# Patient Record
Sex: FEMALE | Race: WHITE | HISPANIC OR LATINO
[De-identification: names, ages, dates, MRNs, and addresses within clinical notes are randomized per-mention and may not be internally consistent; named-entity substitution may affect disease eponyms.]

---

## 2018-06-07 ENCOUNTER — RESULT REVIEW (OUTPATIENT)
Age: 43
End: 2018-06-07

## 2019-06-14 ENCOUNTER — RECORD ABSTRACTING (OUTPATIENT)
Age: 44
End: 2019-06-14

## 2019-06-14 DIAGNOSIS — Z83.3 FAMILY HISTORY OF DIABETES MELLITUS: ICD-10-CM

## 2019-06-14 DIAGNOSIS — Z87.410 PERSONAL HISTORY OF CERVICAL DYSPLASIA: ICD-10-CM

## 2019-06-14 DIAGNOSIS — F41.9 ANXIETY DISORDER, UNSPECIFIED: ICD-10-CM

## 2019-06-14 DIAGNOSIS — Z80.3 FAMILY HISTORY OF MALIGNANT NEOPLASM OF BREAST: ICD-10-CM

## 2019-06-14 DIAGNOSIS — E03.9 HYPOTHYROIDISM, UNSPECIFIED: ICD-10-CM

## 2019-06-14 LAB — CYTOLOGY CVX/VAG DOC THIN PREP: NORMAL

## 2019-06-14 RX ORDER — LEVOTHYROXINE SODIUM 0.14 MG/1
137 TABLET ORAL
Refills: 0 | Status: ACTIVE | COMMUNITY

## 2019-09-12 ENCOUNTER — APPOINTMENT (OUTPATIENT)
Dept: OBGYN | Facility: CLINIC | Age: 44
End: 2019-09-12
Payer: COMMERCIAL

## 2019-09-12 VITALS
BODY MASS INDEX: 29.19 KG/M2 | WEIGHT: 171 LBS | HEIGHT: 64 IN | SYSTOLIC BLOOD PRESSURE: 112 MMHG | DIASTOLIC BLOOD PRESSURE: 70 MMHG

## 2019-09-12 DIAGNOSIS — Z12.39 ENCOUNTER FOR OTHER SCREENING FOR MALIGNANT NEOPLASM OF BREAST: ICD-10-CM

## 2019-09-12 DIAGNOSIS — Z01.411 ENCOUNTER FOR GYNECOLOGICAL EXAMINATION (GENERAL) (ROUTINE) WITH ABNORMAL FINDINGS: ICD-10-CM

## 2019-09-12 DIAGNOSIS — N93.9 ABNORMAL UTERINE AND VAGINAL BLEEDING, UNSPECIFIED: ICD-10-CM

## 2019-09-12 DIAGNOSIS — Z86.19 PERSONAL HISTORY OF OTHER INFECTIOUS AND PARASITIC DISEASES: ICD-10-CM

## 2019-09-12 DIAGNOSIS — Z92.89 PERSONAL HISTORY OF OTHER MEDICAL TREATMENT: ICD-10-CM

## 2019-09-12 DIAGNOSIS — Z87.42 PERSONAL HISTORY OF OTHER DISEASES OF THE FEMALE GENITAL TRACT: ICD-10-CM

## 2019-09-12 LAB
DATE COLLECTED: NORMAL
HEMOCCULT SP1 STL QL: NEGATIVE
QUALITY CONTROL: YES

## 2019-09-12 PROCEDURE — 82270 OCCULT BLOOD FECES: CPT

## 2019-09-12 PROCEDURE — 99396 PREV VISIT EST AGE 40-64: CPT

## 2019-09-12 PROCEDURE — 99213 OFFICE O/P EST LOW 20 MIN: CPT | Mod: 25

## 2019-09-13 NOTE — HISTORY OF PRESENT ILLNESS
[1 Year Ago] : 1 year ago [Good] : being in good health [Healthy Diet] : a healthy diet [Regular Exercise] : regular exercise [Last Pap ___] : Last cervical pap smear was [unfilled] [Perimenopausal] : is perimenopausal [Menstrual Problems] : reports abnormal menses [Irregular Cycle Intervals] : are  irregular [Irregular Duration] : has been irregular [Pregnancy History] : pregnancy history: [Total Preg ___] : [unfilled] [Full Term ___] : [unfilled] [Living ___] : [unfilled] [Definite:  ___ (Date)] : the last menstrual period was [unfilled] [Menarche Age: ____] : age at menarche was [unfilled] [Weight Concerns] : no concerns with her weight [Contraception] : does not use contraception [de-identified] : p u/s 07/14/2015 [Hot Flashes] : no hot flashes [Night Sweats] : no night sweats [Vaginal Itching] : no vaginal itching [Mood Changes] : no mood changes [Sexually Active] : is not sexually active [Regular Cycle Intervals] : periods have been irregular

## 2019-09-13 NOTE — PHYSICAL EXAM
[Awake] : awake [Alert] : alert [Soft] : soft [Oriented x3] : oriented to person, place, and time [Labia Minora] : labia minora [Normal] : clitoris [Labia Majora] : labia major [No Bleeding] : there was no active vaginal bleeding [Pap Obtained] : a Pap smear was performed [Uterine Adnexae] : were not tender and not enlarged [No Tenderness] : no rectal tenderness [Nl Sphincter Tone] : normal sphincter tone [Acute Distress] : no acute distress [Mass] : no breast mass [Nipple Discharge] : no nipple discharge [Axillary LAD] : no axillary lymphadenopathy [Tender] : non tender [Distended] : not distended [H/Smegaly] : no hepatosplenomegaly [Depressed Mood] : not depressed [Flat Affect] : affect not flat [Occult Blood] : occult blood test from digital rectal exam was negative

## 2019-09-13 NOTE — END OF VISIT
[FreeTextEntry3] : I, Shankar Silva, acted solely as a scribe for Dr. Ashley on this date 09/12/2019.\par All medical record entries made by the Scribe were at my, Dr. Ashley’s direction and personally dictated by me on  09/12/2019. I have reviewed the chart and agree that the record accurately reflects my personal performance of the history, physical exam, assessment and plan. I have also personally directed, reviewed, and agreed with the chart.\par \par

## 2019-09-14 LAB — HPV HIGH+LOW RISK DNA PNL CVX: NOT DETECTED

## 2019-09-17 LAB — CYTOLOGY CVX/VAG DOC THIN PREP: NORMAL

## 2019-11-04 ENCOUNTER — APPOINTMENT (OUTPATIENT)
Dept: OBGYN | Facility: CLINIC | Age: 44
End: 2019-11-04

## 2020-01-11 ENCOUNTER — EMERGENCY (EMERGENCY)
Facility: HOSPITAL | Age: 45
LOS: 1 days | Discharge: HOME | End: 2020-01-11
Attending: EMERGENCY MEDICINE
Payer: COMMERCIAL

## 2020-01-11 VITALS
HEIGHT: 63 IN | TEMPERATURE: 98 F | RESPIRATION RATE: 18 BRPM | SYSTOLIC BLOOD PRESSURE: 133 MMHG | WEIGHT: 167.99 LBS | HEART RATE: 59 BPM | DIASTOLIC BLOOD PRESSURE: 87 MMHG | OXYGEN SATURATION: 100 %

## 2020-01-11 VITALS
OXYGEN SATURATION: 100 % | RESPIRATION RATE: 16 BRPM | DIASTOLIC BLOOD PRESSURE: 74 MMHG | SYSTOLIC BLOOD PRESSURE: 109 MMHG | HEART RATE: 54 BPM

## 2020-01-11 LAB
ALBUMIN SERPL ELPH-MCNC: 4.1 G/DL — SIGNIFICANT CHANGE UP (ref 3.3–5.2)
ALP SERPL-CCNC: 69 U/L — SIGNIFICANT CHANGE UP (ref 40–120)
ALT FLD-CCNC: 8 U/L — SIGNIFICANT CHANGE UP
ANION GAP SERPL CALC-SCNC: 10 MMOL/L — SIGNIFICANT CHANGE UP (ref 5–17)
APPEARANCE UR: CLEAR — SIGNIFICANT CHANGE UP
AST SERPL-CCNC: 16 U/L — SIGNIFICANT CHANGE UP
BACTERIA # UR AUTO: ABNORMAL
BASOPHILS # BLD AUTO: 0.04 K/UL — SIGNIFICANT CHANGE UP (ref 0–0.2)
BASOPHILS NFR BLD AUTO: 0.5 % — SIGNIFICANT CHANGE UP (ref 0–2)
BILIRUB SERPL-MCNC: 0.4 MG/DL — SIGNIFICANT CHANGE UP (ref 0.4–2)
BILIRUB UR-MCNC: NEGATIVE — SIGNIFICANT CHANGE UP
BUN SERPL-MCNC: 18 MG/DL — SIGNIFICANT CHANGE UP (ref 8–20)
CALCIUM SERPL-MCNC: 9.4 MG/DL — SIGNIFICANT CHANGE UP (ref 8.6–10.2)
CHLORIDE SERPL-SCNC: 103 MMOL/L — SIGNIFICANT CHANGE UP (ref 98–107)
CO2 SERPL-SCNC: 25 MMOL/L — SIGNIFICANT CHANGE UP (ref 22–29)
COLOR SPEC: YELLOW — SIGNIFICANT CHANGE UP
CREAT SERPL-MCNC: 1.01 MG/DL — SIGNIFICANT CHANGE UP (ref 0.5–1.3)
DIFF PNL FLD: ABNORMAL
EOSINOPHIL # BLD AUTO: 0.16 K/UL — SIGNIFICANT CHANGE UP (ref 0–0.5)
EOSINOPHIL NFR BLD AUTO: 2.1 % — SIGNIFICANT CHANGE UP (ref 0–6)
EPI CELLS # UR: SIGNIFICANT CHANGE UP
GLUCOSE SERPL-MCNC: 98 MG/DL — SIGNIFICANT CHANGE UP (ref 70–115)
GLUCOSE UR QL: NEGATIVE MG/DL — SIGNIFICANT CHANGE UP
HCG SERPL-ACNC: <4 MIU/ML — SIGNIFICANT CHANGE UP
HCT VFR BLD CALC: 33.3 % — LOW (ref 34.5–45)
HGB BLD-MCNC: 11.4 G/DL — LOW (ref 11.5–15.5)
IMM GRANULOCYTES NFR BLD AUTO: 0.5 % — SIGNIFICANT CHANGE UP (ref 0–1.5)
KETONES UR-MCNC: NEGATIVE — SIGNIFICANT CHANGE UP
LEUKOCYTE ESTERASE UR-ACNC: NEGATIVE — SIGNIFICANT CHANGE UP
LYMPHOCYTES # BLD AUTO: 2.38 K/UL — SIGNIFICANT CHANGE UP (ref 1–3.3)
LYMPHOCYTES # BLD AUTO: 30.9 % — SIGNIFICANT CHANGE UP (ref 13–44)
MCHC RBC-ENTMCNC: 30.2 PG — SIGNIFICANT CHANGE UP (ref 27–34)
MCHC RBC-ENTMCNC: 34.2 GM/DL — SIGNIFICANT CHANGE UP (ref 32–36)
MCV RBC AUTO: 88.3 FL — SIGNIFICANT CHANGE UP (ref 80–100)
MONOCYTES # BLD AUTO: 0.6 K/UL — SIGNIFICANT CHANGE UP (ref 0–0.9)
MONOCYTES NFR BLD AUTO: 7.8 % — SIGNIFICANT CHANGE UP (ref 2–14)
NEUTROPHILS # BLD AUTO: 4.47 K/UL — SIGNIFICANT CHANGE UP (ref 1.8–7.4)
NEUTROPHILS NFR BLD AUTO: 58.2 % — SIGNIFICANT CHANGE UP (ref 43–77)
NITRITE UR-MCNC: NEGATIVE — SIGNIFICANT CHANGE UP
PH UR: 6 — SIGNIFICANT CHANGE UP (ref 5–8)
PLATELET # BLD AUTO: 337 K/UL — SIGNIFICANT CHANGE UP (ref 150–400)
POTASSIUM SERPL-MCNC: 4.1 MMOL/L — SIGNIFICANT CHANGE UP (ref 3.5–5.3)
POTASSIUM SERPL-SCNC: 4.1 MMOL/L — SIGNIFICANT CHANGE UP (ref 3.5–5.3)
PROT SERPL-MCNC: 7.6 G/DL — SIGNIFICANT CHANGE UP (ref 6.6–8.7)
PROT UR-MCNC: NEGATIVE MG/DL — SIGNIFICANT CHANGE UP
RBC # BLD: 3.77 M/UL — LOW (ref 3.8–5.2)
RBC # FLD: 12.4 % — SIGNIFICANT CHANGE UP (ref 10.3–14.5)
RBC CASTS # UR COMP ASSIST: ABNORMAL /HPF (ref 0–4)
SODIUM SERPL-SCNC: 138 MMOL/L — SIGNIFICANT CHANGE UP (ref 135–145)
SP GR SPEC: 1.02 — SIGNIFICANT CHANGE UP (ref 1.01–1.02)
UROBILINOGEN FLD QL: NEGATIVE MG/DL — SIGNIFICANT CHANGE UP
WBC # BLD: 7.69 K/UL — SIGNIFICANT CHANGE UP (ref 3.8–10.5)
WBC # FLD AUTO: 7.69 K/UL — SIGNIFICANT CHANGE UP (ref 3.8–10.5)
WBC UR QL: SIGNIFICANT CHANGE UP

## 2020-01-11 PROCEDURE — 72131 CT LUMBAR SPINE W/O DYE: CPT

## 2020-01-11 PROCEDURE — 81001 URINALYSIS AUTO W/SCOPE: CPT

## 2020-01-11 PROCEDURE — 72125 CT NECK SPINE W/O DYE: CPT

## 2020-01-11 PROCEDURE — 84702 CHORIONIC GONADOTROPIN TEST: CPT

## 2020-01-11 PROCEDURE — 80053 COMPREHEN METABOLIC PANEL: CPT

## 2020-01-11 PROCEDURE — 72128 CT CHEST SPINE W/O DYE: CPT | Mod: 26

## 2020-01-11 PROCEDURE — 71250 CT THORAX DX C-: CPT | Mod: 26

## 2020-01-11 PROCEDURE — 99284 EMERGENCY DEPT VISIT MOD MDM: CPT

## 2020-01-11 PROCEDURE — 71250 CT THORAX DX C-: CPT

## 2020-01-11 PROCEDURE — 72125 CT NECK SPINE W/O DYE: CPT | Mod: 26

## 2020-01-11 PROCEDURE — 36415 COLL VENOUS BLD VENIPUNCTURE: CPT

## 2020-01-11 PROCEDURE — 85027 COMPLETE CBC AUTOMATED: CPT

## 2020-01-11 PROCEDURE — 99053 MED SERV 10PM-8AM 24 HR FAC: CPT

## 2020-01-11 PROCEDURE — 72131 CT LUMBAR SPINE W/O DYE: CPT | Mod: 26

## 2020-01-11 PROCEDURE — 99284 EMERGENCY DEPT VISIT MOD MDM: CPT | Mod: 25

## 2020-01-11 PROCEDURE — 96374 THER/PROPH/DIAG INJ IV PUSH: CPT

## 2020-01-11 RX ORDER — SODIUM CHLORIDE 9 MG/ML
3 INJECTION INTRAMUSCULAR; INTRAVENOUS; SUBCUTANEOUS ONCE
Refills: 0 | Status: COMPLETED | OUTPATIENT
Start: 2020-01-11 | End: 2020-01-11

## 2020-01-11 RX ORDER — METHOCARBAMOL 500 MG/1
1 TABLET, FILM COATED ORAL
Qty: 20 | Refills: 0
Start: 2020-01-11 | End: 2020-01-15

## 2020-01-11 RX ORDER — KETOROLAC TROMETHAMINE 30 MG/ML
30 SYRINGE (ML) INJECTION ONCE
Refills: 0 | Status: DISCONTINUED | OUTPATIENT
Start: 2020-01-11 | End: 2020-01-11

## 2020-01-11 RX ADMIN — Medication 30 MILLIGRAM(S): at 03:11

## 2020-01-11 RX ADMIN — SODIUM CHLORIDE 3 MILLILITER(S): 9 INJECTION INTRAMUSCULAR; INTRAVENOUS; SUBCUTANEOUS at 03:11

## 2020-01-11 NOTE — ED ADULT NURSE NOTE - OBJECTIVE STATEMENT
Pt c/o lower back pain s/p mechanical fall at work yesterday. Pt states she slipped and fell on a box at work and landed on her back. Pt denies LOC, nausea, vomiting, HA. Pt also has pain to L arm. Pt PMHx of hypothyroidism. Pt states she took ASA 1000mg with 600mg caffeine at work.

## 2020-01-11 NOTE — ED PROVIDER NOTE - NSFOLLOWUPINSTRUCTIONS_ED_ALL_ED_FT
Advil 3 tabs every 6 hrs with food as needed for pain  Robaxin 500 mg 4x daily for muscle spasms  Percocet 5/325 mg 1 tab every 6 hrs for moderate to severe pain  Follow up with Dr. John next 2-3 days  Return sooner for any problems      Back Pain    Back pain is very common in adults. The cause of back pain is rarely dangerous and the pain often gets better over time. The cause of your back pain may not be known and may include strain of muscles or ligaments, degeneration of the spinal disks, or arthritis. Occasionally the pain may radiate down your leg(s). Over-the-counter medicines to reduce pain and inflammation are often the most helpful. Stretching and remaining active frequently helps the healing process.     SEEK IMMEDIATE MEDICAL CARE IF YOU HAVE ANY OF THE FOLLOWING SYMPTOMS: bowel or bladder control problems, unusual weakness or numbness in your arms or legs, nausea or vomiting, abdominal pain, fever, dizziness/lightheadedness.

## 2020-01-11 NOTE — ED ADULT NURSE NOTE - CHPI ED NUR SYMPTOMS NEG
no bleeding/no numbness/no weakness/no fever/no deformity/no loss of consciousness/no confusion/no vomiting/no tingling

## 2020-01-11 NOTE — ED PROVIDER NOTE - PROGRESS NOTE DETAILS
CT results and labs as noted.  Pt with moderate improvement with IV Toradol.  will d/c on Advil, Robaxin and Percocet with f/u as outpt

## 2020-01-11 NOTE — ED ADULT NURSE NOTE - NSIMPLEMENTINTERV_GEN_ALL_ED
Implemented All Universal Safety Interventions:  Otho to call system. Call bell, personal items and telephone within reach. Instruct patient to call for assistance. Room bathroom lighting operational. Non-slip footwear when patient is off stretcher. Physically safe environment: no spills, clutter or unnecessary equipment. Stretcher in lowest position, wheels locked, appropriate side rails in place.

## 2020-01-11 NOTE — ED PROVIDER NOTE - SKIN, MLM
Skin normal color for race, warm, dry and intact. No evidence of rash. Contusion to the medial aspect of the left elbow and left flank area.

## 2020-01-11 NOTE — ED PROVIDER NOTE - OBJECTIVE STATEMENT
45 y/o F pt with significant PMHx of hypothyroidism presents to the ED c/o left sided back pain and a contusion to the left forearm s/p a fall that occurred yesterday at work. 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 After the incident she stayed at work, but took a powder medication from her job that was 1000mg of ASA mixed with 600mg of Caffeine. Denies LOC. No further complaints at this time.

## 2020-01-11 NOTE — ED PROVIDER NOTE - PATIENT PORTAL LINK FT
You can access the FollowMyHealth Patient Portal offered by [facility] by registering at the following website: [email or web address]. By joining [facility]’s FollowMyHealth portal, you will also be able to view your health information using other applications (apps) compatible with our system.

## 2021-08-06 ENCOUNTER — LABORATORY RESULT (OUTPATIENT)
Age: 46
End: 2021-08-06

## 2021-08-06 ENCOUNTER — APPOINTMENT (OUTPATIENT)
Dept: RHEUMATOLOGY | Facility: CLINIC | Age: 46
End: 2021-08-06
Payer: COMMERCIAL

## 2021-08-06 VITALS
WEIGHT: 179 LBS | HEART RATE: 66 BPM | OXYGEN SATURATION: 98 % | TEMPERATURE: 98 F | BODY MASS INDEX: 30.56 KG/M2 | HEIGHT: 64 IN | RESPIRATION RATE: 17 BRPM | DIASTOLIC BLOOD PRESSURE: 74 MMHG | SYSTOLIC BLOOD PRESSURE: 102 MMHG

## 2021-08-06 PROCEDURE — 99203 OFFICE O/P NEW LOW 30 MIN: CPT

## 2021-08-09 NOTE — HISTORY OF PRESENT ILLNESS
[FreeTextEntry1] : 46 year old female with PMH as listed below presents today for an initial evaluation\par \par Found to have MICHAEL 1: 640, +SSA, +SSB in the setting of SICCA symptoms. Labs reviewed from 04/2021. \par Has severe dry mouth. ++multiple cavities. Has had multiple extensive dental work.  Unable to swallow chips, cookies, salty food with out water. \par \par Initially attributed symptoms to thyroid medications.\par Reports intermittent polyarthralgias, myalgias, photosensitivity. \par \par Symptoms worse to BL knees. Denies swelling. Denies locking/giving out of the knees. \par \par denies fever, denies parotid gland enlargement, dyspareunia, raynauds, pregnancy complications\par \par FH: father with RA

## 2021-08-09 NOTE — PHYSICAL EXAM
[General Appearance - Alert] : alert [General Appearance - In No Acute Distress] : in no acute distress [General Appearance - Well Nourished] : well nourished [General Appearance - Well Developed] : well developed [Sclera] : the sclera and conjunctiva were normal [Outer Ear] : the ears and nose were normal in appearance [Neck Appearance] : the appearance of the neck was normal [Auscultation Breath Sounds / Voice Sounds] : lungs were clear to auscultation bilaterally [Heart Rate And Rhythm] : heart rate was normal and rhythm regular [Heart Sounds] : normal S1 and S2 [Heart Sounds Gallop] : no gallops [Murmurs] : no murmurs [Heart Sounds Pericardial Friction Rub] : no pericardial rub [Bowel Sounds] : normal bowel sounds [Abdomen Soft] : soft [Abdomen Tenderness] : non-tender [No CVA Tenderness] : no ~M costovertebral angle tenderness [No Spinal Tenderness] : no spinal tenderness [Abnormal Walk] : normal gait [Nail Clubbing] : no clubbing  or cyanosis of the fingernails [Musculoskeletal - Swelling] : no joint swelling seen [Motor Tone] : muscle strength and tone were normal [No Focal Deficits] : no focal deficits [Oriented To Time, Place, And Person] : oriented to person, place, and time [] : no rash

## 2021-08-09 NOTE — ASSESSMENT
[FreeTextEntry1] : Sjogrens syndrome\par \par Prior labs with 1: 640, +SSA, +SSB in the setting of SICCA symptoms, multiple cavities, intermittent polyarthralgias, myalgias, photosensitivity\par \par - labs as below\par - start pilocarpine 5mg BID\par - artificial tears prn, opthalmology f.u\par - biotin mouthwash/spray\par - encouraged hydration, lozenges, omega 3 FA supplementation \par - avoidance of caffeine, sugars, candy, food that can exacerbate dry mouth\par \par Discussed treatment plan with the patient. The patient was given the opportunity to ask questions and all questions were answered to their satisfaction.\par

## 2021-08-16 ENCOUNTER — APPOINTMENT (OUTPATIENT)
Dept: RHEUMATOLOGY | Facility: CLINIC | Age: 46
End: 2021-08-16
Payer: COMMERCIAL

## 2021-08-16 VITALS
HEIGHT: 64 IN | OXYGEN SATURATION: 99 % | SYSTOLIC BLOOD PRESSURE: 110 MMHG | TEMPERATURE: 97.8 F | RESPIRATION RATE: 17 BRPM | DIASTOLIC BLOOD PRESSURE: 70 MMHG | HEART RATE: 60 BPM

## 2021-08-16 LAB
25(OH)D3 SERPL-MCNC: 31.8 NG/ML
ALBUMIN MFR SERPL ELPH: 51.9 %
ALBUMIN SERPL ELPH-MCNC: 4.4 G/DL
ALBUMIN SERPL-MCNC: 4.2 G/DL
ALBUMIN/GLOB SERPL: 1.1 RATIO
ALP BLD-CCNC: 66 U/L
ALPHA1 GLOB MFR SERPL ELPH: 3.5 %
ALPHA1 GLOB SERPL ELPH-MCNC: 0.3 G/DL
ALPHA2 GLOB MFR SERPL ELPH: 8.6 %
ALPHA2 GLOB SERPL ELPH-MCNC: 0.7 G/DL
ALT SERPL-CCNC: 9 U/L
ANA PAT FLD IF-IMP: ABNORMAL
ANA PATTERN: ABNORMAL
ANA SER IF-ACNC: ABNORMAL
ANA TITER: ABNORMAL
ANION GAP SERPL CALC-SCNC: 14 MMOL/L
APPEARANCE: CLEAR
AST SERPL-CCNC: 16 U/L
B-GLOBULIN MFR SERPL ELPH: 10.4 %
B-GLOBULIN SERPL ELPH-MCNC: 0.8 G/DL
BASOPHILS # BLD AUTO: 0.05 K/UL
BASOPHILS NFR BLD AUTO: 0.9 %
BILIRUB SERPL-MCNC: 0.2 MG/DL
BILIRUBIN URINE: NEGATIVE
BLOOD URINE: NORMAL
BUN SERPL-MCNC: 20 MG/DL
C3 SERPL-MCNC: 113 MG/DL
C4 SERPL-MCNC: 14 MG/DL
CALCIUM SERPL-MCNC: 9.8 MG/DL
CCP AB SER IA-ACNC: <8 UNITS
CENTROMERE IGG SER-ACNC: <0.2 CD:130001892
CHLORIDE SERPL-SCNC: 103 MMOL/L
CHROMATIN AB SERPL-ACNC: <0.2 AL
CK SERPL-CCNC: 112 U/L
CO2 SERPL-SCNC: 22 MMOL/L
COLOR: YELLOW
CREAT SERPL-MCNC: 1.3 MG/DL
CREAT SPEC-SCNC: 228 MG/DL
CREAT/PROT UR: 0.1 RATIO
CRP SERPL-MCNC: <3 MG/L
DSDNA AB SER-ACNC: 36 IU/ML
ENA RNP AB SER IA-ACNC: 0.3 AL
ENA RNP AB SER IA-ACNC: 0.3 AL
ENA SM AB SER IA-ACNC: <0.2 AL
ENA SS-A AB SER IA-ACNC: >8 AL
ENA SS-B AB SER IA-ACNC: 7.5 AL
EOSINOPHIL # BLD AUTO: 0.11 K/UL
EOSINOPHIL NFR BLD AUTO: 2 %
ERYTHROCYTE [SEDIMENTATION RATE] IN BLOOD BY WESTERGREN METHOD: 36 MM/HR
GAMMA GLOB FLD ELPH-MCNC: 2 G/DL
GAMMA GLOB MFR SERPL ELPH: 25.6 %
GLUCOSE QUALITATIVE U: NEGATIVE
GLUCOSE SERPL-MCNC: 95 MG/DL
HAV IGM SER QL: NONREACTIVE
HBV CORE IGG+IGM SER QL: NONREACTIVE
HBV CORE IGM SER QL: NONREACTIVE
HBV E AB SER QL: NONREACTIVE
HBV E AG SER QL: NONREACTIVE
HBV SURFACE AB SER QL: ABNORMAL
HBV SURFACE AG SER QL: NONREACTIVE
HCT VFR BLD CALC: 37.8 %
HCV AB SER QL: NONREACTIVE
HCV S/CO RATIO: 0.17 S/CO
HEPATITIS A IGG ANTIBODY: REACTIVE
HEPATITIS A IGG ANTIBODY: REACTIVE
HGB BLD-MCNC: 12.1 G/DL
HISTONE AB SER QL: 0.5 UNITS
IGA 24H UR QL IFE: NORMAL
IMM GRANULOCYTES NFR BLD AUTO: 0.6 %
INTERPRETATION SERPL IEP-IMP: NORMAL
KETONES URINE: NEGATIVE
LEUKOCYTE ESTERASE URINE: ABNORMAL
LYMPHOCYTES # BLD AUTO: 1.45 K/UL
LYMPHOCYTES NFR BLD AUTO: 26.8 %
M PROTEIN SPEC IFE-MCNC: NORMAL
M TB IFN-G BLD-IMP: NEGATIVE
MAN DIFF?: NORMAL
MCHC RBC-ENTMCNC: 30 PG
MCHC RBC-ENTMCNC: 32 GM/DL
MCV RBC AUTO: 93.6 FL
MONOCYTES # BLD AUTO: 0.45 K/UL
MONOCYTES NFR BLD AUTO: 8.3 %
NEUTROPHILS # BLD AUTO: 3.32 K/UL
NEUTROPHILS NFR BLD AUTO: 61.4 %
NITRITE URINE: NEGATIVE
PH URINE: 6
PLATELET # BLD AUTO: 286 K/UL
POTASSIUM SERPL-SCNC: 4.3 MMOL/L
PROT SERPL-MCNC: 8 G/DL
PROT UR-MCNC: 13 MG/DL
PROTEIN URINE: NORMAL
QUANTIFERON TB PLUS MITOGEN MINUS NIL: 5.17 IU/ML
QUANTIFERON TB PLUS NIL: 0.02 IU/ML
QUANTIFERON TB PLUS TB1 MINUS NIL: 0 IU/ML
QUANTIFERON TB PLUS TB2 MINUS NIL: 0 IU/ML
RBC # BLD: 4.04 M/UL
RBC # FLD: 12.3 %
RF+CCP IGG SER-IMP: NEGATIVE
RHEUMATOID FACT SER QL: 72 IU/ML
SODIUM SERPL-SCNC: 139 MMOL/L
SPECIFIC GRAVITY URINE: 1.03
THYROGLOB AB SERPL-ACNC: 263 IU/ML
THYROPEROXIDASE AB SERPL IA-ACNC: 2297 IU/ML
TSH SERPL-ACNC: 12.3 UIU/ML
UROBILINOGEN URINE: NORMAL
WBC # FLD AUTO: 5.41 K/UL

## 2021-08-16 PROCEDURE — 99214 OFFICE O/P EST MOD 30 MIN: CPT

## 2021-08-16 NOTE — ASSESSMENT
[FreeTextEntry1] : Sjogrens/ SLE overlap \par \par + SICCA symptoms, multiple cavities, intermittent polyarthralgias, myalgias, photosensitivity\par \par - start HCQ 200mg BID\par - c/w pilocarpine 5mg BID\par - artificial tears prn, opthalmology f.u\par - biotin mouthwash/spray\par - encouraged hydration, lozenges, omega 3 FA supplementation \par - avoidance of caffeine, sugars, candy, food that can exacerbate dry mouth\par \par Discussed treatment plan with the patient. The patient was given the opportunity to ask questions and all questions were answered to their satisfaction.\par

## 2021-08-16 NOTE — PHYSICAL EXAM
[General Appearance - Alert] : alert [General Appearance - In No Acute Distress] : in no acute distress [General Appearance - Well Nourished] : well nourished [General Appearance - Well Developed] : well developed [Sclera] : the sclera and conjunctiva were normal [Outer Ear] : the ears and nose were normal in appearance [Neck Appearance] : the appearance of the neck was normal [Auscultation Breath Sounds / Voice Sounds] : lungs were clear to auscultation bilaterally [Heart Rate And Rhythm] : heart rate was normal and rhythm regular [Heart Sounds] : normal S1 and S2 [Heart Sounds Gallop] : no gallops [Murmurs] : no murmurs [Heart Sounds Pericardial Friction Rub] : no pericardial rub [Bowel Sounds] : normal bowel sounds [Abdomen Soft] : soft [Abdomen Tenderness] : non-tender [No CVA Tenderness] : no ~M costovertebral angle tenderness [No Spinal Tenderness] : no spinal tenderness [Abnormal Walk] : normal gait [Nail Clubbing] : no clubbing  or cyanosis of the fingernails [Musculoskeletal - Swelling] : no joint swelling seen [] : no rash [Motor Tone] : muscle strength and tone were normal [No Focal Deficits] : no focal deficits [Oriented To Time, Place, And Person] : oriented to person, place, and time

## 2021-08-16 NOTE — HISTORY OF PRESENT ILLNESS
[FreeTextEntry1] : Pt presenting today for a f.u visit. \par Labs from 08/2021 reviewed with pt. \par LV started on pilocarpine 5mg BID. Tolerating medication well. Denies side effects.  Overall reports some improvement in dry mouth.

## 2021-11-22 ENCOUNTER — APPOINTMENT (OUTPATIENT)
Dept: RHEUMATOLOGY | Facility: CLINIC | Age: 46
End: 2021-11-22
Payer: COMMERCIAL

## 2021-11-22 VITALS
HEART RATE: 64 BPM | TEMPERATURE: 97.8 F | DIASTOLIC BLOOD PRESSURE: 70 MMHG | BODY MASS INDEX: 29.71 KG/M2 | SYSTOLIC BLOOD PRESSURE: 112 MMHG | WEIGHT: 174 LBS | HEIGHT: 64 IN | RESPIRATION RATE: 17 BRPM | OXYGEN SATURATION: 98 %

## 2021-11-22 PROCEDURE — 99214 OFFICE O/P EST MOD 30 MIN: CPT

## 2021-11-22 NOTE — ASSESSMENT
[FreeTextEntry1] : Sjogrens/ SLE overlap \par \par + SICCA symptoms, multiple cavities, intermittent polyarthralgias, myalgias, photosensitivity\par \par - repeat labs today\par - c/w HCQ 200mg daily\par - c/w pilocarpine 5mg BID\par - artificial tears prn, opthalmology f.u\par - biotin mouthwash/spray\par - encouraged hydration, lozenges, omega 3 FA supplementation \par - avoidance of caffeine, sugars, candy, food that can exacerbate dry mouth\par \par Discussed treatment plan with the patient. The patient was given the opportunity to ask questions and all questions were answered to their satisfaction.\par

## 2021-11-22 NOTE — PHYSICAL EXAM
[General Appearance - Alert] : alert [General Appearance - In No Acute Distress] : in no acute distress [General Appearance - Well Nourished] : well nourished [General Appearance - Well Developed] : well developed [Sclera] : the sclera and conjunctiva were normal [Outer Ear] : the ears and nose were normal in appearance [Neck Appearance] : the appearance of the neck was normal [Auscultation Breath Sounds / Voice Sounds] : lungs were clear to auscultation bilaterally [Heart Rate And Rhythm] : heart rate was normal and rhythm regular [Heart Sounds] : normal S1 and S2 [Heart Sounds Gallop] : no gallops [Murmurs] : no murmurs [Heart Sounds Pericardial Friction Rub] : no pericardial rub [Bowel Sounds] : normal bowel sounds [Abdomen Soft] : soft [Abdomen Tenderness] : non-tender [No CVA Tenderness] : no ~M costovertebral angle tenderness [No Spinal Tenderness] : no spinal tenderness [Abnormal Walk] : normal gait [Nail Clubbing] : no clubbing  or cyanosis of the fingernails [Musculoskeletal - Swelling] : no joint swelling seen [Motor Tone] : muscle strength and tone were normal [] : no rash [No Focal Deficits] : no focal deficits [Oriented To Time, Place, And Person] : oriented to person, place, and time

## 2021-11-22 NOTE — HISTORY OF PRESENT ILLNESS
[FreeTextEntry1] : Pt presenting today for a f.u visit. \par LV started on HCQ 200mg BID. Also taking pilocarpine 5mg BID. Tolerating medication well. Denies side effects.  \par Overall reports some improvement in dry mouth.\par Continues to have fatigue, ++dry eyes.  Denies joint pain/swelling.

## 2021-12-01 LAB
25(OH)D3 SERPL-MCNC: 26.5 NG/ML
ALBUMIN SERPL ELPH-MCNC: 4.1 G/DL
ALP BLD-CCNC: 49 U/L
ALT SERPL-CCNC: 7 U/L
ANION GAP SERPL CALC-SCNC: 10 MMOL/L
AST SERPL-CCNC: 14 U/L
BASOPHILS # BLD AUTO: 0.05 K/UL
BASOPHILS NFR BLD AUTO: 1 %
BILIRUB SERPL-MCNC: 0.5 MG/DL
BUN SERPL-MCNC: 19 MG/DL
CALCIUM SERPL-MCNC: 9.3 MG/DL
CHLORIDE SERPL-SCNC: 105 MMOL/L
CK SERPL-CCNC: 119 U/L
CO2 SERPL-SCNC: 24 MMOL/L
CREAT SERPL-MCNC: 1.23 MG/DL
CRP SERPL-MCNC: <3 MG/L
ENA SS-A AB SER IA-ACNC: >8 AL
ENA SS-B AB SER IA-ACNC: >8 AL
EOSINOPHIL # BLD AUTO: 0.14 K/UL
EOSINOPHIL NFR BLD AUTO: 2.7 %
ERYTHROCYTE [SEDIMENTATION RATE] IN BLOOD BY WESTERGREN METHOD: 42 MM/HR
GLUCOSE SERPL-MCNC: 96 MG/DL
HCT VFR BLD CALC: 35 %
HGB BLD-MCNC: 11.9 G/DL
IMM GRANULOCYTES NFR BLD AUTO: 0.6 %
LYMPHOCYTES # BLD AUTO: 1.22 K/UL
LYMPHOCYTES NFR BLD AUTO: 23.5 %
MAN DIFF?: NORMAL
MCHC RBC-ENTMCNC: 31.6 PG
MCHC RBC-ENTMCNC: 34 GM/DL
MCV RBC AUTO: 93.1 FL
MONOCYTES # BLD AUTO: 0.48 K/UL
MONOCYTES NFR BLD AUTO: 9.2 %
NEUTROPHILS # BLD AUTO: 3.27 K/UL
NEUTROPHILS NFR BLD AUTO: 63 %
PLATELET # BLD AUTO: 313 K/UL
POTASSIUM SERPL-SCNC: 4.3 MMOL/L
PROT SERPL-MCNC: 7.4 G/DL
RBC # BLD: 3.76 M/UL
RBC # FLD: 12.4 %
SODIUM SERPL-SCNC: 140 MMOL/L
WBC # FLD AUTO: 5.19 K/UL

## 2021-12-27 ENCOUNTER — APPOINTMENT (OUTPATIENT)
Dept: RHEUMATOLOGY | Facility: CLINIC | Age: 46
End: 2021-12-27
Payer: COMMERCIAL

## 2021-12-27 VITALS
WEIGHT: 178 LBS | TEMPERATURE: 98 F | BODY MASS INDEX: 30.39 KG/M2 | HEART RATE: 67 BPM | DIASTOLIC BLOOD PRESSURE: 70 MMHG | OXYGEN SATURATION: 99 % | HEIGHT: 64 IN | SYSTOLIC BLOOD PRESSURE: 102 MMHG | RESPIRATION RATE: 17 BRPM

## 2021-12-27 PROCEDURE — 99214 OFFICE O/P EST MOD 30 MIN: CPT

## 2021-12-27 NOTE — HISTORY OF PRESENT ILLNESS
[FreeTextEntry1] : Pt presenting today for a f.u visit. No current complaints. Reports feeling well overall. \par Labs from 11/2021 reviewed with pt. \par Currently taking HCQ 200mg BID, pilocarpine 5mg BID. Tolerating medication well. Denies side effects.

## 2021-12-27 NOTE — ASSESSMENT
[FreeTextEntry1] : Sjogrens/ SLE overlap \par + SICCA symptoms, multiple cavities, intermittent polyarthralgias, myalgias, photosensitivity\par \par Currently reports feeling well\par \par - c/w HCQ 200mg daily\par - c/w pilocarpine 5mg BID\par - artificial tears prn, opthalmology f.u\par - biotin mouthwash/spray\par - encouraged hydration, lozenges, omega 3 FA supplementation \par - avoidance of caffeine, sugars, candy, food that can exacerbate dry mouth\par \par Discussed treatment plan with the patient. The patient was given the opportunity to ask questions and all questions were answered to their satisfaction.\par

## 2023-05-17 ENCOUNTER — APPOINTMENT (OUTPATIENT)
Dept: RHEUMATOLOGY | Facility: CLINIC | Age: 48
End: 2023-05-17
Payer: COMMERCIAL

## 2023-05-17 VITALS
RESPIRATION RATE: 17 BRPM | HEART RATE: 61 BPM | HEIGHT: 64 IN | OXYGEN SATURATION: 98 % | TEMPERATURE: 98.1 F | WEIGHT: 180 LBS | DIASTOLIC BLOOD PRESSURE: 76 MMHG | SYSTOLIC BLOOD PRESSURE: 120 MMHG | BODY MASS INDEX: 30.73 KG/M2

## 2023-05-17 PROCEDURE — 99215 OFFICE O/P EST HI 40 MIN: CPT

## 2023-05-17 NOTE — ASSESSMENT
[FreeTextEntry1] : Sjogrens/ SLE overlap \par + SICCA symptoms, multiple cavities, intermittent polyarthralgias, myalgias, photosensitivity\par \par Was doing well on HCQ, pilocarpine and then lost to follow up. Last seen 2021\par \par - repeat labs\par - will consider restarting HCQ, pilocarpine after reviewing labs\par - artificial tears prn, opthalmology f.u\par - biotin mouthwash/spray\par - encouraged hydration, lozenges, omega 3 FA supplementation \par - avoidance of caffeine, sugars, candy, food that can exacerbate dry mouth\par - opthalmology and dental f/u\par \par Discussed treatment plan with the patient. The patient was given the opportunity to ask questions and all questions were answered to their satisfaction.\par

## 2023-05-17 NOTE — HISTORY OF PRESENT ILLNESS
[FreeTextEntry1] : Pt presenting today for a f.u visit for Sjogrens/ SLE overlap. Last seen 11/2021 and then lost to follow up. Chart reviewed since LV. \par Was doing well on HCQ, pilocarpine. Off all medications since LV. \par Today presenting with CC of increased fatigue, arthralgias, SICCA symptoms. \par Denies fever, chills, CP, SOB, abd pain, new rashes, falls. Denies swelling to the joints. \par Has not followed up with opthalmology, dentist yet.

## 2023-06-09 ENCOUNTER — APPOINTMENT (OUTPATIENT)
Dept: RHEUMATOLOGY | Facility: CLINIC | Age: 48
End: 2023-06-09

## 2023-06-27 ENCOUNTER — APPOINTMENT (OUTPATIENT)
Dept: RHEUMATOLOGY | Facility: CLINIC | Age: 48
End: 2023-06-27
Payer: COMMERCIAL

## 2023-06-27 VITALS
HEIGHT: 64 IN | BODY MASS INDEX: 30.73 KG/M2 | TEMPERATURE: 97.5 F | HEART RATE: 56 BPM | WEIGHT: 180 LBS | RESPIRATION RATE: 17 BRPM | DIASTOLIC BLOOD PRESSURE: 74 MMHG | OXYGEN SATURATION: 98 % | SYSTOLIC BLOOD PRESSURE: 100 MMHG

## 2023-06-27 LAB
ALBUMIN SERPL ELPH-MCNC: 4.2 G/DL
ALP BLD-CCNC: 67 U/L
ALT SERPL-CCNC: 9 U/L
ANA PAT FLD IF-IMP: ABNORMAL
ANA SER IF-ACNC: ABNORMAL
ANION GAP SERPL CALC-SCNC: 10 MMOL/L
APPEARANCE: CLEAR
AST SERPL-CCNC: 16 U/L
BASOPHILS # BLD AUTO: 0.07 K/UL
BASOPHILS NFR BLD AUTO: 1.3 %
BILIRUB SERPL-MCNC: 0.3 MG/DL
BILIRUBIN URINE: NEGATIVE
BLOOD URINE: ABNORMAL
BUN SERPL-MCNC: 27 MG/DL
C3 SERPL-MCNC: 99 MG/DL
C4 SERPL-MCNC: 12 MG/DL
CALCIUM SERPL-MCNC: 9.2 MG/DL
CCP AB SER IA-ACNC: <8 UNITS
CENTROMERE IGG SER-ACNC: <0.2 AL
CHLORIDE SERPL-SCNC: 105 MMOL/L
CHROMATIN AB SERPL-ACNC: <0.2 AL
CK SERPL-CCNC: 124 U/L
CO2 SERPL-SCNC: 22 MMOL/L
COLOR: YELLOW
CREAT SERPL-MCNC: 1.21 MG/DL
CREAT SPEC-SCNC: 76 MG/DL
CREAT/PROT UR: 0.2 RATIO
CRP SERPL-MCNC: <3 MG/L
DSDNA AB SER-ACNC: 34 IU/ML
EGFR: 56 ML/MIN/1.73M2
ENA RNP AB SER IA-ACNC: 0.3 AL
ENA RNP AB SER IA-ACNC: 0.3 AL
ENA SCL70 IGG SER IA-ACNC: <0.2 AL
ENA SM AB SER IA-ACNC: <0.2 AL
ENA SS-A AB SER IA-ACNC: >8 AL
ENA SS-B AB SER IA-ACNC: >8 AL
EOSINOPHIL # BLD AUTO: 0.19 K/UL
EOSINOPHIL NFR BLD AUTO: 3.4 %
ERYTHROCYTE [SEDIMENTATION RATE] IN BLOOD BY WESTERGREN METHOD: 67 MM/HR
GLUCOSE QUALITATIVE U: NEGATIVE MG/DL
GLUCOSE SERPL-MCNC: 102 MG/DL
HAV IGM SER QL: NONREACTIVE
HBV CORE IGG+IGM SER QL: NONREACTIVE
HBV CORE IGM SER QL: NONREACTIVE
HBV E AB SER QL: NONREACTIVE
HBV E AG SER QL: NONREACTIVE
HBV SURFACE AB SER QL: NONREACTIVE
HBV SURFACE AG SER QL: NONREACTIVE
HCT VFR BLD CALC: 35.4 %
HCV AB SER QL: NONREACTIVE
HCV S/CO RATIO: 0.14 S/CO
HEPATITIS A IGG ANTIBODY: REACTIVE
HEPATITIS A IGG ANTIBODY: REACTIVE
HGB BLD-MCNC: 12.1 G/DL
HISTONE AB SER QL: 0.5 UNITS
IMM GRANULOCYTES NFR BLD AUTO: 0.7 %
KETONES URINE: NEGATIVE MG/DL
LEUKOCYTE ESTERASE URINE: ABNORMAL
LYMPHOCYTES # BLD AUTO: 1.48 K/UL
LYMPHOCYTES NFR BLD AUTO: 26.6 %
M TB IFN-G BLD-IMP: NEGATIVE
MAN DIFF?: NORMAL
MCHC RBC-ENTMCNC: 31.3 PG
MCHC RBC-ENTMCNC: 34.2 GM/DL
MCV RBC AUTO: 91.5 FL
MONOCYTES # BLD AUTO: 0.54 K/UL
MONOCYTES NFR BLD AUTO: 9.7 %
NEUTROPHILS # BLD AUTO: 3.25 K/UL
NEUTROPHILS NFR BLD AUTO: 58.3 %
NITRITE URINE: NEGATIVE
PH URINE: 6
PLATELET # BLD AUTO: 279 K/UL
POTASSIUM SERPL-SCNC: 3.6 MMOL/L
PROT SERPL-MCNC: 7.5 G/DL
PROT UR-MCNC: 14 MG/DL
PROTEIN URINE: NEGATIVE MG/DL
QUANTIFERON TB PLUS MITOGEN MINUS NIL: >10 IU/ML
QUANTIFERON TB PLUS NIL: 0.02 IU/ML
QUANTIFERON TB PLUS TB1 MINUS NIL: 0 IU/ML
QUANTIFERON TB PLUS TB2 MINUS NIL: 0 IU/ML
RBC # BLD: 3.87 M/UL
RBC # FLD: 12.1 %
RF+CCP IGG SER-IMP: NEGATIVE
RHEUMATOID FACT SER QL: 49 IU/ML
RNA POLYMERASE III IGG: 55 UNITS
SODIUM SERPL-SCNC: 137 MMOL/L
SPECIFIC GRAVITY URINE: 1.01
THYROGLOB AB SERPL-ACNC: 225 IU/ML
THYROPEROXIDASE AB SERPL IA-ACNC: 1797 IU/ML
TSH SERPL-ACNC: 5.21 UIU/ML
UROBILINOGEN URINE: 0.2 MG/DL
WBC # FLD AUTO: 5.57 K/UL

## 2023-06-27 PROCEDURE — 99215 OFFICE O/P EST HI 40 MIN: CPT

## 2023-06-29 NOTE — ASSESSMENT
[FreeTextEntry1] : Sjogrens/ SLE overlap \par + SICCA symptoms, multiple cavities, intermittent polyarthralgias, myalgias, photosensitivity\par \par Was doing well on HCQ, pilocarpine and then lost to follow up. Last seen 2021\par \par - restart HCQ 200mg BID, pilocarpine 5mg BID\par - artificial tears prn, opthalmology f.u\par - biotin mouthwash/spray\par - encouraged hydration, lozenges, omega 3 FA supplementation \par - avoidance of caffeine, sugars, candy, food that can exacerbate dry mouth\par - opthalmology and dental f/u\par \par Discussed treatment plan with the patient. The patient was given the opportunity to ask questions and all questions were answered to their satisfaction.\par

## 2023-06-29 NOTE — HISTORY OF PRESENT ILLNESS
[FreeTextEntry1] : Pt presenting today for a f.u visit for Sjogrens/ SLE overlap. Last seen 05/2023. Chart reviewed since . Recent labs from 05/2023 reviewed with pt- Has elevated MICHAEL, SSA/SSB, borderline dsdna, RF, elevated esr. \par Was doing well on HCQ, pilocarpine in the past. Off all medications since 2021\par Today presenting with CC of increased fatigue, arthralgias, SICCA symptoms. \par Denies fever, chills, CP, SOB, abd pain, new rashes, falls. Denies swelling to the joints. \par Has not followed up with opthalmology, dentist yet. \par ROS otherwise unchanged from ,

## 2023-08-01 ENCOUNTER — LABORATORY RESULT (OUTPATIENT)
Age: 48
End: 2023-08-01

## 2023-08-01 ENCOUNTER — APPOINTMENT (OUTPATIENT)
Dept: RHEUMATOLOGY | Facility: CLINIC | Age: 48
End: 2023-08-01
Payer: COMMERCIAL

## 2023-08-01 VITALS
WEIGHT: 180 LBS | DIASTOLIC BLOOD PRESSURE: 64 MMHG | OXYGEN SATURATION: 98 % | BODY MASS INDEX: 30.73 KG/M2 | SYSTOLIC BLOOD PRESSURE: 100 MMHG | RESPIRATION RATE: 17 BRPM | HEIGHT: 64 IN | HEART RATE: 63 BPM

## 2023-08-01 DIAGNOSIS — Z79.899 OTHER LONG TERM (CURRENT) DRUG THERAPY: ICD-10-CM

## 2023-08-01 DIAGNOSIS — R76.0 RAISED ANTIBODY TITER: ICD-10-CM

## 2023-08-01 DIAGNOSIS — M35.00 SICCA SYNDROME, UNSPECIFIED: ICD-10-CM

## 2023-08-01 DIAGNOSIS — M25.50 PAIN IN UNSPECIFIED JOINT: ICD-10-CM

## 2023-08-01 PROCEDURE — 99214 OFFICE O/P EST MOD 30 MIN: CPT

## 2023-08-01 RX ORDER — PILOCARPINE HYDROCHLORIDE 5 MG/1
5 TABLET, FILM COATED ORAL
Qty: 60 | Refills: 5 | Status: ACTIVE | COMMUNITY
Start: 2021-08-06 | End: 1900-01-01

## 2023-08-01 RX ORDER — HYDROXYCHLOROQUINE SULFATE 200 MG/1
200 TABLET, FILM COATED ORAL TWICE DAILY
Qty: 60 | Refills: 5 | Status: ACTIVE | COMMUNITY
Start: 2021-08-16 | End: 1900-01-01

## 2023-08-01 NOTE — HISTORY OF PRESENT ILLNESS
[FreeTextEntry1] : Pt presenting today for a f.u visit for Sjogrens/ SLE overlap. Last seen 06/2023. Chart reviewed since .  Restarted HCQ and pilocarpine LV. Tolerating medication well. Denies side effects.  Overall with significant improvement in symptoms with HCQ.  Denies fever, chills, CP, SOB, abd pain, new rashes, falls. Denies swelling to the joints.  Has not followed up with ophthalmology, dentist yet.  ROS otherwise unchanged from ,

## 2023-08-01 NOTE — ASSESSMENT
[facility] - Intensive Care ([address] Medicine  Progress Note    Patient Name: Andrzej Sinclair  MRN: [id number]  Patient Class: IP- Inpatient   Admission Date: 3/8/2023  Length of Stay: 1 days  Attending Physician: Kay Irwin MD  Primary Care Provider: Viktoriya Jaeger NP        Subjective:     Principal Problem:Intractable vomiting with nausea        HPI:  39 y/o M with medical hx of poorly controlled DM1 and hyperemesis cannabinoid syndrome who re-presents to [facility] for intractable nausea and vomiting. Patient w/ previous admission, discharged 1 day ago for intractable n/v for the past 2 weeks. Pt stated he had vomited 10x the past 24 hours, cannot keep anything down, w/ abdominal pain usually prior to vomiting episodes. He also reports headaches, chest pain, dizziness, and SOB. He denies hemoptysis, fevers, chills, and diarrhea. Pt has been to the hospital 4 times over the past 2 weeks for similar issues. He is a poorly controlled diabetic, stating his sugars usually run around 300 when he checks them. Last A1c was 10.2 on 3/1/23. Pt has tried using PO phenergan and dicyclomine with minimal relief of symptoms. Pt was recently been on insulin pump, but claims it has not been working well for him. He is currently not wearing pump in ED. He has not been able to see GI outpatient for his persistent issues. Pt denies recent use of marijuana and states he has not ingested/smoked marijuana for at least 3-4 weeks.     On both admissions, patient AF HDS, mildly tachycardic particularly following vomiting episodes. Satting well on RA. Labs without significant electrolyte derangement. Glucose 215. BHB 1.5. Labs and presentation unconcerning for DKA. Patient to be re-admitted for intractable n/v w/ intolerance of PO intake.         Overview/Hospital Course:  Admitted for intractable nausea and vomiting in setting of slightly high BHB. Will advance diet as clinically appropriate. Currently on full liquid diet.  [FreeTextEntry1] : Sjogrens/ SLE overlap  + SICCA symptoms, multiple cavities, intermittent polyarthralgias, myalgias, photosensitivity  Was doing well on HCQ, pilocarpine and then lost to follow up. Last seen 2021 Restarted medications 06/2023 and reports feeling much better  - repeat labs prior to follow up - c/w HCQ 200mg BID, pilocarpine 5mg BID - artificial tears prn, ophthalmology f.u - biotin mouthwash/spray - encouraged hydration, lozenges, omega 3 FA supplementation  - avoidance of caffeine, sugars, candy, food that can exacerbate dry mouth - ophthalmology and dental f/u  Discussed treatment plan with the patient. The patient was given the opportunity to ask questions and all questions were answered to their satisfaction.  Course c/b hyperglycemia and worsening abdominal pain. Continues on NS infusion. Adjusted insulin as needed. KUB w non-obstructive bowel gas pattern.       Interval History:     NAEON. On CLD and nausea controlled this AM and patient agreeable to discharge however, early afternoon started having significant nausea post eating pudding.     Review of Systems   Constitutional:  Negative for chills and fever.   HENT:  Negative for congestion.    Eyes:  Negative for visual disturbance.   Respiratory:  Negative for cough, shortness of breath and wheezing.    Cardiovascular:  Negative for chest pain.   Gastrointestinal:  Positive for nausea and vomiting. Negative for abdominal pain, constipation and diarrhea.   Genitourinary:  Negative for difficulty urinating.   Musculoskeletal:  Negative for arthralgias and back pain.   Skin:  Negative for color change and pallor.   Neurological:  Negative for dizziness, tremors, syncope, weakness, light-headedness and headaches.   Psychiatric/Behavioral:  Negative for agitation and behavioral problems.    Objective:     Vital Signs (Most Recent):  Temp: 98 °F (36.7 °C) (03/11/23 1121)  Pulse: (!) 59 (03/11/23 1121)  Resp: 18 (03/11/23 1121)  BP: (!) 195/96 (03/11/23 1121)  SpO2: 100 % (03/11/23 1121)   Vital Signs (24h Range):  Temp:  [98 °F (36.7 °C)-99.4 °F (37.4 °C)] 98 °F (36.7 °C)  Pulse:  [59-83] 59  Resp:  [18] 18  SpO2:  [96 %-100 %] 100 %  BP: (135-195)/(67-96) 195/96     Weight: 90.7 kg (200 lb)  Body mass index is 25.68 kg/m².    Intake/Output Summary (Last 24 hours) at 3/11/2023 1328  Last data filed at 3/11/2023 0824  Gross per 24 hour   Intake 3482.17 ml   Output 200 ml   Net 3282.17 ml        Physical Exam  Constitutional:       Appearance: Normal appearance. He is normal weight.      Comments: Uncomfortable; ill-appearing   HENT:      Head: Normocephalic and atraumatic.      Right Ear: External ear normal.      Left Ear: External ear normal.      Nose: Nose normal.       Mouth/Throat:      Mouth: Mucous membranes are moist.      Pharynx: Oropharynx is clear.   Eyes:      Conjunctiva/sclera: Conjunctivae normal.   Cardiovascular:      Rate and Rhythm: Normal rate and regular rhythm.      Pulses: Normal pulses.      Heart sounds: Normal heart sounds.   Pulmonary:      Effort: Pulmonary effort is normal.      Breath sounds: Normal breath sounds.   Abdominal:      General: Abdomen is flat. Bowel sounds are normal.      Palpations: Abdomen is soft.      Tenderness: There is no abdominal tenderness (diffuse w/ palpation).   Musculoskeletal:      Cervical back: Neck supple.      Right lower leg: No edema.      Left lower leg: No edema.   Skin:     General: Skin is warm and dry.      Capillary Refill: Capillary refill takes less than 2 seconds.   Neurological:      General: No focal deficit present.      Mental Status: He is alert and oriented to person, place, and time. Mental status is at baseline.   Psychiatric:         Mood and Affect: Mood normal.         Behavior: Behavior normal.       Significant Labs: All pertinent labs within the past 24 hours have been reviewed.    Significant Imaging: I have reviewed all pertinent imaging results/findings within the past 24 hours.      Assessment/Plan:      * Intractable vomiting with nausea  Patient re-admitted d/t intolerance of PO intake at home; patient reports attempting to increase liquid intake and increase activity which triggered further nausea/vomiting. Mucous membranes appear moist.     -s/p Droperidol and compazine IV   -scheduled PO phenegran Q6H, PRN zofran IV 8mg and compazine  -advance diet as tolerated   - gentle IVF in setting of continued n/v   -POCT glucose checks; PRNs in case of hypoglycemia  - has PCP apt 3/13 and GI apt in 2 weeks    GERD (gastroesophageal reflux disease)  Complaining of worsening abdominal pain, refractory to GI cocktail and sucrasulfate.     - Home protonix QD  - if patient unable to tolerate full  liquid diet, will consult GI.   - prn GI cocktail and sucralfate     Cannabinoid hyperemesis syndrome  Patient likely experiencing intractable n/v 2/2 cannabis use; UDS on last admission positive for marijuana. Patient reports last use 3-4 weeks ago due to onset of these symptoms. He reports he had not used on discharge 1 day ago as well.    -see intractable n/v    Type 1 diabetes mellitus with complications  Patient's FSGs are uncontrolled due to hyperglycemia on current medication regimen.  Last A1c reviewed-   Lab Results   Component Value Date    HGBA1C 10.2 (H) 03/01/2023     Most recent fingerstick glucose reviewed-   Recent Labs   Lab 03/10/23  1653 03/10/23  2029 03/11/23  0717 03/11/23  1120   POCTGLUCOSE 221* 204* 134* 182*     Current correctional scale  Medium  Maintain anti-hyperglycemic dose as follows-   Antihyperglycemics (From admission, onward)    Start     Stop Route Frequency Ordered    03/10/23 1534  insulin aspart U-100 pen 1-10 Units         -- SubQ Before meals & nightly PRN 03/10/23 1435    03/10/23 1130  insulin aspart U-100 pen 2 Units         -- SubQ 3 times daily with meals 03/10/23 1013    03/10/23 0900  insulin detemir U-100 pen 10 Units         -- SubQ 2 times daily 03/10/23 0638        Hold Oral hypoglycemics while patient is in the hospital.    - Prandial insulin 2U TID WM  - Basal insulin 10U BID   - bg inpatient goal 140-180    Primary hypertension  Home losartan 10mg QD    Ketosis  Patient w/ small elevation in ketones on admission; much lower than previous admission w/ low concern for DKA.     -CTM clinically  -daily CMPs    CKD (chronic kidney disease) stage 3, GFR 30-59 ml/min  Cr 1.2 on admission (at baseline)     -CTM w/ daily RFP      VTE Risk Mitigation (From admission, onward)         Ordered     Place sequential compression device  Until discontinued         03/08/23 2005     Place sequential compression device  Until discontinued         03/08/23 2005                 Discharge Planning   PRECIOUS: 3/11/2023     Code Status: Full Code   Is the patient medically ready for discharge?: No    Reason for patient still in hospital (select all that apply): Treatment  Discharge Plan A: Home with family          Supriya Peck MD  Department of Hospital Medicine   [facility] - Intensive Care ([place]-[address])

## 2023-09-27 ENCOUNTER — EMERGENCY (EMERGENCY)
Facility: HOSPITAL | Age: 48
LOS: 1 days | Discharge: HOME | End: 2023-09-27
Attending: EMERGENCY MEDICINE
Payer: SELF-PAY

## 2023-09-27 VITALS
TEMPERATURE: 98 F | SYSTOLIC BLOOD PRESSURE: 110 MMHG | OXYGEN SATURATION: 98 % | WEIGHT: 183.42 LBS | HEIGHT: 64 IN | DIASTOLIC BLOOD PRESSURE: 76 MMHG | HEART RATE: 69 BPM | RESPIRATION RATE: 20 BRPM

## 2023-09-27 PROCEDURE — 73080 X-RAY EXAM OF ELBOW: CPT

## 2023-09-27 PROCEDURE — 99284 EMERGENCY DEPT VISIT MOD MDM: CPT

## 2023-09-27 PROCEDURE — 73080 X-RAY EXAM OF ELBOW: CPT | Mod: 26,RT

## 2023-09-27 PROCEDURE — 99284 EMERGENCY DEPT VISIT MOD MDM: CPT | Mod: 25

## 2023-09-27 PROCEDURE — 71046 X-RAY EXAM CHEST 2 VIEWS: CPT | Mod: 26

## 2023-09-27 PROCEDURE — 71046 X-RAY EXAM CHEST 2 VIEWS: CPT

## 2023-09-27 RX ORDER — IBUPROFEN 200 MG
1 TABLET ORAL
Qty: 15 | Refills: 0
Start: 2023-09-27 | End: 2023-10-01

## 2023-09-27 RX ORDER — IBUPROFEN 200 MG
600 TABLET ORAL ONCE
Refills: 0 | Status: COMPLETED | OUTPATIENT
Start: 2023-09-27 | End: 2023-09-27

## 2023-09-27 RX ORDER — HYDROCORTISONE 0.5 %
1 CREAM (GRAM) TOPICAL
Qty: 10 | Refills: 0
Start: 2023-09-27 | End: 2023-10-06

## 2023-09-27 RX ORDER — ACETAMINOPHEN 500 MG
650 TABLET ORAL ONCE
Refills: 0 | Status: COMPLETED | OUTPATIENT
Start: 2023-09-27 | End: 2023-09-27

## 2023-09-27 RX ORDER — LIDOCAINE 4 G/100G
1 CREAM TOPICAL ONCE
Refills: 0 | Status: COMPLETED | OUTPATIENT
Start: 2023-09-27 | End: 2023-09-27

## 2023-09-27 RX ORDER — ACETAMINOPHEN 500 MG
2 TABLET ORAL
Qty: 30 | Refills: 0
Start: 2023-09-27 | End: 2023-10-01

## 2023-09-27 RX ORDER — METHOCARBAMOL 500 MG/1
2 TABLET, FILM COATED ORAL
Qty: 18 | Refills: 0
Start: 2023-09-27 | End: 2023-09-29

## 2023-09-27 RX ADMIN — Medication 600 MILLIGRAM(S): at 09:12

## 2023-09-27 RX ADMIN — Medication 650 MILLIGRAM(S): at 09:12

## 2023-09-27 RX ADMIN — LIDOCAINE 1 PATCH: 4 CREAM TOPICAL at 09:12
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
## 2023-09-27 NOTE — ED PROVIDER NOTE - PATIENT PORTAL LINK FT
You can access the FollowMyHealth Patient Portal offered by [facility] by registering at the following website: [email or web address]. By joining [facility]’s FollowMyHealth portal, you will also be able to view your health information using other applications (apps) compatible with our system.
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
## 2023-09-27 NOTE — ED ADULT NURSE NOTE - NS ED NURSE LEVEL OF CONSCIOUSNESS MENTAL STATUS
Subjective   Patient ID: Jocelyne Barroso is a 67 y.o. female who presents for c/o (C/o right rib area pain x  months).    HPI     Review of Systems   Gastrointestinal:  Positive for abdominal pain and nausea. Negative for blood in stool, constipation and diarrhea.       Objective   /82 (BP Location: Right arm)   Pulse 76   Temp 36.7 °C (98 °F) (Temporal)   Ht 1.524 m (5')   Wt 65.3 kg (144 lb)   SpO2 98%   BMI 28.12 kg/m²     Physical Exam  Cardiovascular:      Rate and Rhythm: Regular rhythm.      Heart sounds: Normal heart sounds.   Pulmonary:      Breath sounds: Normal breath sounds.   Abdominal:      Palpations: Abdomen is soft.      Comments: Right upper quadrant tenderness no rebound or guarding.  No palpable masses   Neurological:      Mental Status: She is alert.         Assessment/Plan   Problem List Items Addressed This Visit       Benign hypertension - Primary    Abdominal pain, RUQ (right upper quadrant)    Relevant Orders    MR abdomen w IV contrast MRCP    Creatinine, Serum    Diverticulosis of colon without diverticulitis (Chronic)    Gallbladder sludge    Relevant Orders    MR abdomen w IV contrast MRCP    Creatinine, Serum           Awake/Alert/Cooperative
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
## 2023-10-23 ENCOUNTER — EMERGENCY (EMERGENCY)
Facility: HOSPITAL | Age: 48
LOS: 1 days | Discharge: HOME | End: 2023-10-23
Attending: EMERGENCY MEDICINE
Payer: COMMERCIAL

## 2023-10-23 VITALS
SYSTOLIC BLOOD PRESSURE: 106 MMHG | OXYGEN SATURATION: 98 % | DIASTOLIC BLOOD PRESSURE: 76 MMHG | HEART RATE: 74 BPM | RESPIRATION RATE: 18 BRPM | TEMPERATURE: 98 F

## 2023-10-23 VITALS
TEMPERATURE: 98 F | RESPIRATION RATE: 16 BRPM | HEIGHT: 64 IN | OXYGEN SATURATION: 100 % | HEART RATE: 66 BPM | DIASTOLIC BLOOD PRESSURE: 76 MMHG | WEIGHT: 186.51 LBS | SYSTOLIC BLOOD PRESSURE: 122 MMHG

## 2023-10-23 LAB
APPEARANCE UR: CLEAR — SIGNIFICANT CHANGE UP
APPEARANCE UR: CLEAR — SIGNIFICANT CHANGE UP
BILIRUB UR-MCNC: NEGATIVE — SIGNIFICANT CHANGE UP
BILIRUB UR-MCNC: NEGATIVE — SIGNIFICANT CHANGE UP
COLOR SPEC: YELLOW — SIGNIFICANT CHANGE UP
COLOR SPEC: YELLOW — SIGNIFICANT CHANGE UP
DIFF PNL FLD: NEGATIVE — SIGNIFICANT CHANGE UP
DIFF PNL FLD: NEGATIVE — SIGNIFICANT CHANGE UP
GLUCOSE UR QL: NEGATIVE MG/DL — SIGNIFICANT CHANGE UP
GLUCOSE UR QL: NEGATIVE MG/DL — SIGNIFICANT CHANGE UP
HCG UR QL: NEGATIVE — SIGNIFICANT CHANGE UP
HCG UR QL: NEGATIVE — SIGNIFICANT CHANGE UP
KETONES UR-MCNC: NEGATIVE — SIGNIFICANT CHANGE UP
KETONES UR-MCNC: NEGATIVE — SIGNIFICANT CHANGE UP
LEUKOCYTE ESTERASE UR-ACNC: NEGATIVE — SIGNIFICANT CHANGE UP
LEUKOCYTE ESTERASE UR-ACNC: NEGATIVE — SIGNIFICANT CHANGE UP
NITRITE UR-MCNC: NEGATIVE — SIGNIFICANT CHANGE UP
NITRITE UR-MCNC: NEGATIVE — SIGNIFICANT CHANGE UP
PH UR: 6.5 — SIGNIFICANT CHANGE UP (ref 5–8)
PH UR: 6.5 — SIGNIFICANT CHANGE UP (ref 5–8)
PROT UR-MCNC: NEGATIVE — SIGNIFICANT CHANGE UP
PROT UR-MCNC: NEGATIVE — SIGNIFICANT CHANGE UP
SP GR SPEC: 1.01 — SIGNIFICANT CHANGE UP (ref 1.01–1.02)
SP GR SPEC: 1.01 — SIGNIFICANT CHANGE UP (ref 1.01–1.02)
UROBILINOGEN FLD QL: NEGATIVE MG/DL — SIGNIFICANT CHANGE UP
UROBILINOGEN FLD QL: NEGATIVE MG/DL — SIGNIFICANT CHANGE UP

## 2023-10-23 PROCEDURE — 99284 EMERGENCY DEPT VISIT MOD MDM: CPT

## 2023-10-23 PROCEDURE — 81025 URINE PREGNANCY TEST: CPT

## 2023-10-23 PROCEDURE — 81003 URINALYSIS AUTO W/O SCOPE: CPT

## 2023-10-23 PROCEDURE — 99283 EMERGENCY DEPT VISIT LOW MDM: CPT

## 2023-10-23 RX ORDER — METHOCARBAMOL 500 MG/1
1 TABLET, FILM COATED ORAL
Qty: 20 | Refills: 0
Start: 2023-10-23

## 2023-10-23 RX ORDER — METHOCARBAMOL 500 MG/1
1000 TABLET, FILM COATED ORAL ONCE
Refills: 0 | Status: COMPLETED | OUTPATIENT
Start: 2023-10-23 | End: 2023-10-23

## 2023-10-23 RX ORDER — IBUPROFEN 200 MG
600 TABLET ORAL ONCE
Refills: 0 | Status: COMPLETED | OUTPATIENT
Start: 2023-10-23 | End: 2023-10-23

## 2023-10-23 RX ADMIN — METHOCARBAMOL 1000 MILLIGRAM(S): 500 TABLET, FILM COATED ORAL at 09:38

## 2023-10-23 RX ADMIN — Medication 600 MILLIGRAM(S): at 09:38

## 2023-10-23 NOTE — ED PROVIDER NOTE - PATIENT PORTAL LINK FT
You can access the FollowMyHealth Patient Portal offered by [facility] by registering at the following website: [email or web address]. By joining [facility]’s FollowMyHealth portal, you will also be able to view your health information using other applications (apps) compatible with our system.

## 2023-10-23 NOTE — ED ADULT NURSE NOTE - NSFALLUNIVINTERV_ED_ALL_ED
Bed/Stretcher in lowest position, wheels locked, appropriate side rails in place/Call bell, personal items and telephone in reach/Instruct patient to call for assistance before getting out of bed/chair/stretcher/Non-slip footwear applied when patient is off stretcher/[place] to call system/Physically safe environment - no spills, clutter or unnecessary equipment/Purposeful proactive rounding/Room/bathroom lighting operational, light cord in reach

## 2023-10-23 NOTE — ED ADULT NURSE NOTE - SUICIDE SCREENING DEPRESSION
pt comes to ED for abdominal pain pt went to [facility] on Saturday had lab work done and was d/madisyn told she had gastritis pt has appt with gi tomorrow but the pain got worse today. pt VSS pt appears comfortable NAD Negative

## 2023-10-23 NOTE — ED PROVIDER NOTE - NSFOLLOWUPINSTRUCTIONS_ED_ALL_ED_FT
Anaprox DS 2 times daily with food as needed for pain  Robaxin 500 mg 4 times daily as needed for muscle spasms  Apply moist heat to area 20 minutes on every 3-4 hours while awake  Follow-up with Dr. John next 2 to 3 days  Return sooner for any problems

## 2023-10-23 NOTE — ED PROVIDER NOTE - NS ED ROS FT
Const: Denies fever, chills  HEENT: (+) dry mouth, (+) dry eyes, Denies blurry vision, sore throat  Neck: Denies neck pain/stiffness  Resp: Denies coughing, SOB  Cardiovascular: Denies CP, palpitations, LE edema  GI: Denies nausea, vomiting, abdominal pain, diarrhea, constipation, blood in stool  : Denies urinary frequency/urgency/dysuria, hematuria  MSK: (+) right sided back pain  Neuro: Denies HA, dizziness, numbness, weakness  Skin: Denies rashes.

## 2023-10-23 NOTE — ED PROVIDER NOTE - CLINICAL SUMMARY MEDICAL DECISION MAKING FREE TEXT BOX
Pt with atraumatic lower back pain with no neuro deficits and appears to be MSK.  will check UA, UCG and re-eval after meds

## 2023-10-23 NOTE — ED PROVIDER NOTE - PHYSICAL EXAMINATION
Gen: NAD, AOx3  HEENT: Mild Goiter seen on PE  Lung: CTAB, no respiratory distress  CV: rrr, no murmur, Normal perfusion  Abd: soft, NTND  MSK: Mild bony tenderness to palpation, tenderness to paravertebral muscles on right side of lumbar spine. Pt had normal ROM, was able to sit up with minimal pain. No edema, no visible deformities  Neuro: No focal neurologic deficits

## 2023-10-23 NOTE — ED ADULT TRIAGE NOTE - CHIEF COMPLAINT QUOTE
Pt c/o right flank pain/rigth side back pain, increased thirst and urination.  Onset this weekend.  Pt states difficulty moving.  Denies injury.  Hx. of lupus.

## 2023-10-23 NOTE — ED ADULT NURSE NOTE - OBJECTIVE STATEMENT
Pt A&Ox4 arrives complaining of R flank pain since Saturday. Airway patent. Respirations equal and unlabored. No JVD or bilateral edema noted. Rt flank is tender to palpation. No n/v. Denies urinary complaints. Bed locked in lowest position. Call bell within reach. Pt is able to make needs known.

## 2023-10-23 NOTE — ED PROVIDER NOTE - OBJECTIVE STATEMENT
Pt is a 48y female with a PMHx of Lupus and Sjogren's syndrome complaining of right sided back pain x3 days. Pt describes the pain as a constant stabbing pain that has been progressively getting worse. Provoking factors include laying down and movement. Pt states she took Tylenol yesterday with little relief and has not taken any medication today. Pt denies having similar symptoms in the past and denies radiation of pain or shooting down the legs. Pt also complains of self resolving dizziness on Friday.   Pt denies HA, LOC, recent travels, recent illness, fever, chills, night sweats, weight loss, fatigue, weakness, numbness/tingling, SOB, chest pain, palpitations, abdominal pain and states her last menstrual period was 3 months ago. Denies hx of kidney stones, gallstones or frequent UTI/kidney infections.

## 2023-10-23 NOTE — ED PROVIDER NOTE - PROGRESS NOTE DETAILS
Urine and pregnancy test results as noted patient improved with meds and feels well for discharge at this time will DC on Anaprox DS and Robaxin with follow-up Dr. Terri John as an outpatient

## 2023-10-23 NOTE — ED PROVIDER NOTE - ATTENDING CONTRIBUTION TO CARE
48-year-old female with history of lupus and stroke and syndrome presents to ED complaining of  atraumatic right sided lower back pain times the last 3 days.   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  Patient denies any associated weakness or sensory loss.  Patient denies any associated urinary symptoms bowel bladder incontinence.  On exam patient awake and alert appears uncomfortable  laying in stretcher.  PERRL, mucosal membranes moist, neck supple full range of motion, heart regular, lungs clear bilaterally, abdomen soft nontender, extremities full range of motion, no straight leg raising muscle strength 5/5 bilateral upper extremities lower extremities full sensation.  MSK positive right lateral lumbar paraspinal tenderness to palpation, no midline tenderness bony step-off or deformity.  Pain appears to be musculoskeletal in nature will medicate with Motrin, Robaxin, check UA and pregnancy test and reevaluate after meds

## 2023-10-23 NOTE — ED ADULT NURSE REASSESSMENT NOTE - NS ED NURSE REASSESS COMMENT FT1
Pt A&Ox4 resting comfortably in the stretcher. Reports relief of pain. Bed is locked in the lowest position. Call bell within reach.

## 2023-12-01 ENCOUNTER — APPOINTMENT (OUTPATIENT)
Dept: RHEUMATOLOGY | Facility: CLINIC | Age: 48
End: 2023-12-01

## 2024-06-11 PROBLEM — M35.00 SJOGREN SYNDROME, UNSPECIFIED: Chronic | Status: ACTIVE | Noted: 2023-10-23

## 2024-06-11 PROBLEM — E03.9 HYPOTHYROIDISM, UNSPECIFIED: Chronic | Status: ACTIVE | Noted: 2023-10-23

## 2024-06-11 PROBLEM — M32.9 SYSTEMIC LUPUS ERYTHEMATOSUS, UNSPECIFIED: Chronic | Status: ACTIVE | Noted: 2023-10-23

## 2024-07-09 ENCOUNTER — APPOINTMENT (OUTPATIENT)
Dept: RHEUMATOLOGY | Facility: CLINIC | Age: 49
End: 2024-07-09
Payer: COMMERCIAL

## 2024-07-09 VITALS
SYSTOLIC BLOOD PRESSURE: 116 MMHG | HEART RATE: 67 BPM | HEIGHT: 64 IN | DIASTOLIC BLOOD PRESSURE: 74 MMHG | OXYGEN SATURATION: 98 %

## 2024-07-09 DIAGNOSIS — M35.00 SICCA SYNDROME, UNSPECIFIED: ICD-10-CM

## 2024-07-09 DIAGNOSIS — Z79.899 OTHER LONG TERM (CURRENT) DRUG THERAPY: ICD-10-CM

## 2024-07-09 DIAGNOSIS — M25.50 PAIN IN UNSPECIFIED JOINT: ICD-10-CM

## 2024-07-09 PROCEDURE — 99215 OFFICE O/P EST HI 40 MIN: CPT

## 2024-07-09 PROCEDURE — G2211 COMPLEX E/M VISIT ADD ON: CPT

## 2024-07-22 LAB
ALBUMIN MFR SERPL ELPH: 52.2 %
ALBUMIN SERPL ELPH-MCNC: 4.4 G/DL
ALBUMIN/GLOB SERPL: 1.1 RATIO
ALP BLD-CCNC: 59 U/L
ALPHA1 GLOB MFR SERPL ELPH: 3.3 %
ALPHA2 GLOB MFR SERPL ELPH: 7.7 %
ALT SERPL-CCNC: 14 U/L
ANA PAT FLD IF-IMP: ABNORMAL
ANA SER IF-ACNC: ABNORMAL
ANION GAP SERPL CALC-SCNC: 12 MMOL/L
AST SERPL-CCNC: 22 U/L
B-GLOBULIN MFR SERPL ELPH: 10.1 %
B-GLOBULIN SERPL ELPH-MCNC: 0.8 G/DL
BASOPHILS # BLD AUTO: 0.04 K/UL
BASOPHILS NFR BLD AUTO: 0.8 %
BILIRUB SERPL-MCNC: 0.6 MG/DL
BUN SERPL-MCNC: 20 MG/DL
C4 SERPL-MCNC: 12 MG/DL
CALCIUM SERPL-MCNC: 9.7 MG/DL
CCP AB SER IA-ACNC: <8 UNITS
CENTROMERE IGG SER-ACNC: <0.2 AL
CHLORIDE SERPL-SCNC: 104 MMOL/L
CK SERPL-CCNC: 188 U/L
CO2 SERPL-SCNC: 24 MMOL/L
CREAT SPEC-SCNC: 115 MG/DL
CREAT/PROT UR: 0.1 RATIO
DSDNA AB SER-ACNC: 1 IU/ML
EGFR: 53 ML/MIN/1.73M2
ENA RNP AB SER IA-ACNC: 0.3 AL
ENA RNP AB SER IA-ACNC: 0.3 AL
ENA SM AB SER IA-ACNC: <0.2 AL
ENA SS-A AB SER IA-ACNC: >8 AL
ENA SS-B AB SER IA-ACNC: >8 AL
EOSINOPHIL # BLD AUTO: 0.21 K/UL
EOSINOPHIL NFR BLD AUTO: 4.2 %
ERYTHROCYTE [SEDIMENTATION RATE] IN BLOOD BY WESTERGREN METHOD: 46 MM/HR
GAMMA GLOB FLD ELPH-MCNC: 2.2 G/DL
GAMMA GLOB MFR SERPL ELPH: 26.7 %
GLUCOSE SERPL-MCNC: 78 MG/DL
HAV IGM SER QL: NONREACTIVE
HBV CORE IGG+IGM SER QL: NONREACTIVE
HBV CORE IGM SER QL: NONREACTIVE
HBV E AB SER QL: NONREACTIVE
HBV E AG SER QL: NONREACTIVE
HBV SURFACE AB SER QL: NONREACTIVE
HBV SURFACE AG SER QL: NONREACTIVE
HCT VFR BLD CALC: 35.9 %
HCV AB SER QL: NONREACTIVE
HCV S/CO RATIO: 0.13 S/CO
HEPATITIS A IGG ANTIBODY: REACTIVE
HEPATITIS A IGG ANTIBODY: REACTIVE
HISTONE AB SER QL: 0.6 UNITS
IMM GRANULOCYTES NFR BLD AUTO: 0.6 %
INTERPRETATION SERPL IEP-IMP: NORMAL
LYMPHOCYTES NFR BLD AUTO: 27.6 %
M PROTEIN SPEC IFE-MCNC: NORMAL
M TB IFN-G BLD-IMP: NEGATIVE
MAN DIFF?: NORMAL
MCHC RBC-ENTMCNC: 31.2 PG
MCHC RBC-ENTMCNC: 34.8 GM/DL
MCV RBC AUTO: 89.5 FL
MONOCYTES # BLD AUTO: 0.39 K/UL
MONOCYTES NFR BLD AUTO: 7.8 %
NEUTROPHILS # BLD AUTO: 2.93 K/UL
NEUTROPHILS NFR BLD AUTO: 59 %
PLATELET # BLD AUTO: 288 K/UL
POTASSIUM SERPL-SCNC: 4.4 MMOL/L
PROT SERPL-MCNC: 8.2 G/DL
PROT SERPL-MCNC: 8.2 G/DL
PROT UR-MCNC: 9 MG/DL
QUANTIFERON TB PLUS NIL: 0.03 IU/ML
QUANTIFERON TB PLUS TB1 MINUS NIL: 0 IU/ML
QUANTIFERON TB PLUS TB2 MINUS NIL: 0 IU/ML
RBC # BLD: 4.01 M/UL
RF+CCP IGG SER-IMP: NEGATIVE
RHEUMATOID FACT SER QL: 65 IU/ML
SODIUM SERPL-SCNC: 140 MMOL/L
THYROGLOB AB SERPL-ACNC: 966 IU/ML
THYROPEROXIDASE AB SERPL IA-ACNC: 746 IU/ML
TSH SERPL-ACNC: 31.3 UIU/ML
WBC # FLD AUTO: 4.97 K/UL

## 2024-08-10 ENCOUNTER — RX ONLY (RX ONLY)
Age: 49
End: 2024-08-10

## 2024-08-10 ENCOUNTER — OFFICE (OUTPATIENT)
Dept: URBAN - METROPOLITAN AREA CLINIC 115 | Facility: CLINIC | Age: 49
Setting detail: OPHTHALMOLOGY
End: 2024-08-10
Payer: COMMERCIAL

## 2024-08-10 DIAGNOSIS — Z79.899: ICD-10-CM

## 2024-08-10 DIAGNOSIS — H16.223: ICD-10-CM

## 2024-08-10 DIAGNOSIS — H01.004: ICD-10-CM

## 2024-08-10 DIAGNOSIS — H01.001: ICD-10-CM

## 2024-08-10 PROCEDURE — 92004 COMPRE OPH EXAM NEW PT 1/>: CPT | Performed by: OPHTHALMOLOGY

## 2024-08-10 PROCEDURE — 92083 EXTENDED VISUAL FIELD XM: CPT | Performed by: OPHTHALMOLOGY

## 2024-08-10 PROCEDURE — 92134 CPTRZ OPH DX IMG PST SGM RTA: CPT | Performed by: OPHTHALMOLOGY

## 2024-08-10 ASSESSMENT — CONFRONTATIONAL VISUAL FIELD TEST (CVF)
OD_FINDINGS: FULL
OS_FINDINGS: FULL

## 2024-08-10 ASSESSMENT — LID EXAM ASSESSMENTS
OS_BLEPHARITIS: LUL T
OD_BLEPHARITIS: RUL T

## 2024-08-15 ENCOUNTER — NON-APPOINTMENT (OUTPATIENT)
Age: 49
End: 2024-08-15

## 2024-08-21 ENCOUNTER — OFFICE (OUTPATIENT)
Dept: URBAN - METROPOLITAN AREA CLINIC 115 | Facility: CLINIC | Age: 49
Setting detail: OPHTHALMOLOGY
End: 2024-08-21
Payer: COMMERCIAL

## 2024-08-21 DIAGNOSIS — H16.223: ICD-10-CM

## 2024-08-21 DIAGNOSIS — L93.0: ICD-10-CM

## 2024-08-21 DIAGNOSIS — B88.0: ICD-10-CM

## 2024-08-21 DIAGNOSIS — H01.004: ICD-10-CM

## 2024-08-21 DIAGNOSIS — H01.001: ICD-10-CM

## 2024-08-21 DIAGNOSIS — Z79.899: ICD-10-CM

## 2024-08-21 PROCEDURE — 92012 INTRM OPH EXAM EST PATIENT: CPT | Performed by: OPHTHALMOLOGY

## 2024-08-21 ASSESSMENT — LID EXAM ASSESSMENTS
OD_COMMENTS: W/ SLEEVING
OD_BLEPHARITIS: RUL T
OS_BLEPHARITIS: LUL T
OS_COMMENTS: W/ SLEEVING

## 2024-08-21 ASSESSMENT — CONFRONTATIONAL VISUAL FIELD TEST (CVF)
OS_FINDINGS: FULL
OD_FINDINGS: FULL

## 2024-08-27 ENCOUNTER — APPOINTMENT (OUTPATIENT)
Dept: RHEUMATOLOGY | Facility: CLINIC | Age: 49
End: 2024-08-27
Payer: COMMERCIAL

## 2024-08-27 DIAGNOSIS — M35.00 SICCA SYNDROME, UNSPECIFIED: ICD-10-CM

## 2024-08-27 DIAGNOSIS — Z79.899 OTHER LONG TERM (CURRENT) DRUG THERAPY: ICD-10-CM

## 2024-08-27 DIAGNOSIS — R76.0 RAISED ANTIBODY TITER: ICD-10-CM

## 2024-08-27 PROCEDURE — 99215 OFFICE O/P EST HI 40 MIN: CPT

## 2024-08-27 PROCEDURE — G2211 COMPLEX E/M VISIT ADD ON: CPT

## 2024-08-27 NOTE — ASSESSMENT
[FreeTextEntry1] : Sjogrens/ SLE overlap  + SICCA symptoms, multiple cavities, intermittent polyarthralgia's, myalgias, photosensitivity  Was doing well on HCQ, pilocarpine and then lost to follow up. Last seen 2021 Restarted medications 06/2023 and reports feeling much better and then lost to follow up again Restarted medications again 06/2024  - c/w HCQ 200mg BID, pilocarpine 5mg BID - artificial tears prn, ophthalmology f.u - biotin mouthwash/spray - encouraged hydration, lozenges, omega 3 FA supplementation  - avoidance of caffeine, sugars, candy, food that can exacerbate dry mouth - ophthalmology and dental f/u - encouraged compliance with medications and follow up visits  Discussed treatment plan with the patient. The patient was given the opportunity to ask questions and all questions were answered to their satisfaction.

## 2024-08-27 NOTE — HISTORY OF PRESENT ILLNESS
[Home] : at home, [unfilled] , at the time of the visit. [Medical Office: ([facility])___] : at the medical office located in  [Verbal consent obtained from patient] : the patient, [unfilled] [FreeTextEntry1] : Pt presenting today for a f.u visit for Sjogren's/ SLE overlap. Last seen 07/2024. LV re started HCQ, pilocarpine. Tolerating well. No side effects.  Labs from 07/2024 reviewed with pt in length.  Today presenting with mild SICCA symptoms, arthralgias, fatigue, morning stiffness 30 mins+. Noted improvement with medications thus far.  Denies fever, chills, CP, SOB, abd pain, new rashes, falls. Denies swelling to the joints.  She is following ophthalmology and dentist ROS otherwise unchanged from

## 2024-08-27 NOTE — PHYSICAL EXAM
[General Appearance - Alert] : alert [General Appearance - In No Acute Distress] : in no acute distress [General Appearance - Well Nourished] : well nourished [General Appearance - Well Developed] : well developed [Sclera] : the sclera and conjunctiva were normal [Outer Ear] : the ears and nose were normal in appearance [Neck Appearance] : the appearance of the neck was normal [Auscultation Breath Sounds / Voice Sounds] : lungs were clear to auscultation bilaterally [Heart Rate And Rhythm] : heart rate was normal and rhythm regular [Heart Sounds] : normal S1 and S2 [Heart Sounds Gallop] : no gallops [Murmurs] : no murmurs [Heart Sounds Pericardial Friction Rub] : no pericardial rub [Bowel Sounds] : normal bowel sounds [Abdomen Soft] : soft [Abdomen Tenderness] : non-tender [No CVA Tenderness] : no ~M costovertebral angle tenderness [No Spinal Tenderness] : no spinal tenderness [] : no rash [No Focal Deficits] : no focal deficits [Oriented To Time, Place, And Person] : oriented to person, place, and time [Abnormal Walk] : normal gait [Nail Clubbing] : no clubbing  or cyanosis of the fingernails [Musculoskeletal - Swelling] : no joint swelling seen [Motor Tone] : muscle strength and tone were normal

## 2024-08-29 ENCOUNTER — EMERGENCY (EMERGENCY)
Facility: HOSPITAL | Age: 49
LOS: 1 days | Discharge: HOME | End: 2024-08-29
Attending: STUDENT IN AN ORGANIZED HEALTH CARE EDUCATION/TRAINING PROGRAM
Payer: SELF-PAY

## 2024-08-29 VITALS
DIASTOLIC BLOOD PRESSURE: 78 MMHG | OXYGEN SATURATION: 100 % | WEIGHT: 179.9 LBS | HEIGHT: 64 IN | SYSTOLIC BLOOD PRESSURE: 107 MMHG | TEMPERATURE: 98 F | HEART RATE: 82 BPM | RESPIRATION RATE: 16 BRPM

## 2024-08-29 PROCEDURE — 99284 EMERGENCY DEPT VISIT MOD MDM: CPT

## 2024-08-29 PROCEDURE — 73630 X-RAY EXAM OF FOOT: CPT | Mod: 26,RT

## 2024-08-29 PROCEDURE — 73610 X-RAY EXAM OF ANKLE: CPT

## 2024-08-29 PROCEDURE — 96372 THER/PROPH/DIAG INJ SC/IM: CPT

## 2024-08-29 PROCEDURE — 73610 X-RAY EXAM OF ANKLE: CPT | Mod: 26,RT

## 2024-08-29 PROCEDURE — 73630 X-RAY EXAM OF FOOT: CPT

## 2024-08-29 RX ORDER — KETOROLAC TROMETHAMINE 30 MG/ML
30 INJECTION, SOLUTION INTRAMUSCULAR ONCE
Refills: 0 | Status: DISCONTINUED | OUTPATIENT
Start: 2024-08-29 | End: 2024-08-29

## 2024-08-29 RX ADMIN — KETOROLAC TROMETHAMINE 30 MILLIGRAM(S): 30 INJECTION, SOLUTION INTRAMUSCULAR at 13:20
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
## 2024-08-29 NOTE — ED ADULT NURSE NOTE - NSFALLRISKINTERV_ED_ALL_ED

## 2024-08-29 NOTE — ED PROVIDER NOTE - NS ED ATTENDING STATEMENT MOD
Chart(s)/Patient Patient has not seen Mnai in years, needs appointment for any refills. Left vm for Slime to call and schedule an appointment.   This was a shared visit with the FRANCINE. I reviewed and verified the documentation.

## 2024-08-29 NOTE — ED ADULT TRIAGE NOTE - CHIEF COMPLAINT QUOTE
c/o R ankle pain after stepping off curb.  Pedal pulse intact, able to move digits, color normal for race.  Splint applied by EMS.

## 2024-08-29 NOTE — ED ADULT NURSE NOTE - OBJECTIVE STATEMENT
Patient is A&Ox4, in NAD. Patient presents to ED c/o right ankle pain after stepping off of a curb while at work today. States she twisted it. Medicated as per orders. Taken to xray.

## 2024-08-29 NOTE — ED PROVIDER NOTE - PHYSICAL EXAMINATION
General-alert and oriented to person place and time, nontoxic appearing, pleasant cooperative, NAD  HEENT-normocephalic, atraumatic, NT to palp, EOMI, PERRLA, no conjunctival injections,   MSK- moves all extremities, tender to lateral malleolus, and atfl, nt to midfoot, forefoot, hindfoot,  Pulses-  2+ DP/PT  Back- nt to palp of cervical, thoracic, lumbar spine, nt to palp of paraspinal m., No CVA tenderness  Neuro- no focal deficits, sensation intact

## 2024-08-29 NOTE — ED PROVIDER NOTE - PATIENT PORTAL LINK FT
You can access the FollowMyHealth Patient Portal offered by [facility] by registering at the following website: [email or web address]. By joining "[email or web address]’s FollowMyHealth portal, you will also be able to view your health information using other applications (apps) compatible with our system.

## 2024-08-29 NOTE — ED PROVIDER NOTE - CARE PROVIDER_API CALL
Blayne Mccartney  Orthopaedic Surgery  [address] [place] [id number]  Phone: [phone]  Fax: [phone]  Follow Up Time:

## 2024-08-29 NOTE — ED ADULT NURSE NOTE - SUICIDE SCREENING QUESTION 1
Cardiology    Consult Requested By: Dr. Gonzalez  Reason for Consult: atrial fibrillation    SUBJECTIVE:     History of Present Illness:  Patient is a 89 y.o. male presents with no history obtained. Per the chart, he was admitted for poor apetite, weight loss and dehydration. He has a scar in the mid chest and thus most likely previous bypass surgery.  He has a DNR status from admission on 7/17 and [facility] EKG on admission was atrial fibrillation with fast ventricular rate and the second EKG showeds low heart rate. He was started back on the metoprolol 50 BID on 3/2/18 andchanged to 25 on 3/4/18.       Review of patient's allergies indicates:  No Known Allergies    Past Medical History:   Diagnosis Date    CHF (congestive heart failure)     Diabetes mellitus     Hypertension      History reviewed. No pertinent surgical history.  History reviewed. No pertinent family history.  Social History   Substance Use Topics    Smoking status: Unknown If Ever Smoked    Smokeless tobacco: Not on file      Comment: Pt unable to answer questions.    Alcohol use No      Comment: Pt unable to answer questions.        Home meds:  No current facility-administered medications on file prior to encounter.      Current Outpatient Prescriptions on File Prior to Encounter   Medication Sig Dispense Refill    metoprolol tartrate (LOPRESSOR) 50 MG tablet Take 50 mg by mouth 2 (two) times daily.      acetaminophen (TYLENOL) 325 MG tablet Take 325 mg by mouth every 6 (six) hours as needed for Pain.      docusate sodium (COLACE) 100 MG capsule Take 100 mg by mouth once daily at 6am.      furosemide (LASIX) 40 MG tablet Take 1 tablet (40 mg total) by mouth daily as needed (cough with copious white mucous). 30 tablet 0    hydrocodone-acetaminophen 5-325mg (NORCO) 5-325 mg per tablet Take 1 tablet by mouth every 8 (eight) hours as needed for Pain.      mirtazapine (REMERON) 15 MG tablet Take 15 mg by mouth every  evening.      pravastatin (PRAVACHOL) 80 MG tablet Take 80 mg by mouth once daily.      ranitidine (ZANTAC) 150 MG tablet Take 150 mg by mouth 2 (two) times daily.      sertraline (ZOLOFT) 50 MG tablet Take 25 mg by mouth every evening.          Current meds:  Scheduled Meds:   amLODIPine  10 mg Oral Daily    heparin (porcine)  5,000 Units Subcutaneous Q8H    metoprolol tartrate  25 mg Oral BID    mirtazapine  15 mg Oral QHS    pantoprazole  40 mg Oral Daily    sertraline  25 mg Oral QHS     Continuous Infusions:  PRN Meds:.dextrose 50%, dextrose 50%, glucagon (human recombinant), glucose, glucose, magnesium hydroxide 400 mg/5 ml, ondansetron, sodium chloride 0.9%      OBJECTIVE:     Vital Signs (Most Recent)  Temp: 98.1 °F (36.7 °C) (03/05/18 1128)  Pulse: 78 (03/05/18 1128)  Resp: 19 (03/05/18 0751)  BP: 117/60 (03/05/18 1128)  SpO2: 96 % (03/05/18 1124)    Vital Signs Range (Last 24H):  Temp:  [97.2 °F (36.2 °C)-98.7 °F (37.1 °C)]   Pulse:  [52-82]   Resp:  [16-19]   BP: (104-145)/(54-70)   SpO2:  [95 %-100 %]     Physical Exam:  Limited:  Lungs: fairly clear  Heart: regular, normal S1,S2, soft systolic ejection murmur LSB    Laboratory:  LABS  CBC    Recent Labs  Lab 03/03/18  0209 03/04/18  0818 03/05/18  0659   WBC 5.97 5.26 4.70   RBC 3.31* 3.45* 3.22*   HGB 10.3* 10.7* 10.6*   HCT 32.5* 34.0* 31.7*   * 123* 120*   MCV 98 99* 98   MCH 31.1* 31.0 32.9*   MCHC 31.7* 31.5* 33.4     BMP    Recent Labs  Lab 03/03/18  1200 03/04/18  0818 03/05/18  0700    143 143   K 4.1 3.8 3.5   CO2 33* 28 31*    108 107   BUN 17 20 19   CREATININE 1.1 1.1 1.1   * 89 87         Recent Labs  Lab 03/03/18  0209 03/03/18  1200 03/04/18  0818 03/05/18  0700   CALCIUM 9.1 9.6 9.0 9.1   MG 1.9 2.1 1.9 1.8   PHOS 2.4*  --  2.5* 2.4*       LFT    Recent Labs  Lab 03/03/18  0209 03/04/18  0818 03/05/18  0700   PROT 5.7* 5.8* 5.7*   ALBUMIN 2.5* 2.4* 2.4*   BILITOT 0.6 0.5 0.5   AST 14 20 13   ALKPHOS 73  75 74   ALT 8* 7* 8*       COAGS  No results for input(s): PT, INR, APTT in the last 168 hours.  CE    Recent Labs  Lab 03/01/18  1655 03/01/18  2350 03/02/18  0627   TROPONINI 0.043* 0.054* 0.038*     BNP    Recent Labs  Lab 03/01/18  1655   *     Lipid panel:  Lab Results   Component Value Date    CHOL 193 07/10/2017     Lab Results   Component Value Date    HDL 57 07/10/2017     Lab Results   Component Value Date    LDLCALC 123.4 07/10/2017     Lab Results   Component Value Date    TRIG 63 07/10/2017     Lab Results   Component Value Date    CHOLHDL 29.5 07/10/2017     Diagnostic Results:  EKG: atrial fibrillation with fast ventricular rate 1st and then slow; now appears to be in sinus on telemetry  Previous EKG from 2017: atrial flutter most likely   CXR:chronic changes       Chart review:  Echo: 7/10/17: markedly enlarged LA, nromal EF, diastolic dysfunction; mild elevation in PA pressure     ASSESSMENT/PLAN:     1. Paroxysmal atrial fibrillation in very elderly gentleman admitted for failure to thrive     Plan:at this time, would continue the low dose of beta blockers only. He is at high risk for anticoagulation and thus use ASA alone. No further workup needed.   Jovita Mares MD     No

## 2024-08-29 NOTE — ED PROVIDER NOTE - OBJECTIVE STATEMENT
48 y/o female with lupus, hypothyroids, Sjorgens present sot the ED c/o right ankle pain. notes she was walking out and slipped on a curb rolling her right ankle. Fell, did not hit her head, no loc. Pt notes difficulty ambulating due to pain. Denies numbness, coldness of distal extremity.

## 2024-08-29 NOTE — ED PROVIDER NOTE - ATTENDING APP SHARED VISIT CONTRIBUTION OF CARE
68 y/o female with lupus, hypothyroids, Sjorgens present sot the ED c/o right ankle pain s/p twisting it while walking.     I, Carly Louise, evaluated the patient with the PA, and completed the key components of the history and physical exam. I then discussed the management plan with the PA.

## 2024-09-11 ENCOUNTER — APPOINTMENT (OUTPATIENT)
Dept: ORTHOPEDIC SURGERY | Facility: CLINIC | Age: 49
End: 2024-09-11
Payer: OTHER MISCELLANEOUS

## 2024-09-11 VITALS — WEIGHT: 177 LBS | BODY MASS INDEX: 30.22 KG/M2 | HEIGHT: 64 IN

## 2024-09-11 DIAGNOSIS — S93.401A SPRAIN OF UNSPECIFIED LIGAMENT OF RIGHT ANKLE, INITIAL ENCOUNTER: ICD-10-CM

## 2024-09-11 DIAGNOSIS — M25.571 PAIN IN RIGHT ANKLE AND JOINTS OF RIGHT FOOT: ICD-10-CM

## 2024-09-11 DIAGNOSIS — S99.911A UNSPECIFIED INJURY OF RIGHT ANKLE, INITIAL ENCOUNTER: ICD-10-CM

## 2024-09-11 PROCEDURE — 99204 OFFICE O/P NEW MOD 45 MIN: CPT
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
## 2024-09-11 NOTE — DISCUSSION/SUMMARY
[de-identified] : Based on the patient's physical examination and pain, I do believe the patient sprained her ankle.  We are going to hold off on advanced imaging.  I provided the patient with an ASO brace for her right ankle to stabilize her ankle, use as directed.  Physical therapy prescription given for ankle strengthening conditioning program following ankle sprain protocol.  Over-the-counter NSAIDs and Tylenol as needed for pain.  Return to work protocol.  All questions answered.  Follow-up in 4 to 6 weeks as needed if she continues to have pain.  If she continues to have pain at that time and she needs a follow-up, consider an MRI then.  All of her questions were answered.  She understood and agreed to the treatment course.  The patient will remain currently 100% temporarily disabled from returning to work.  She will discuss returning to work in the near future.

## 2024-09-11 NOTE — PHYSICAL EXAM
[de-identified] : Right ankle Physical Examination:  General: Alert and oriented x3.  In no acute distress.  Pleasant in nature with a normal affect.  No apparent respiratory distress.  Erythema, Warmth, Rubor: Negative Swelling: + mild lateral ankle swelling.   ROM: 1. Dorsiflexion: 10 degrees 2. Plantarflexion: 40 degrees 3. Inversion: 30 degrees 4. Eversion: 20 degrees 5. Subtalar: 10 degrees  Tenderness to Palpation:  1. Lateral Malleolus: Negative 2. Medial Malleolus: Negative 3. Proximal Fibular Pain: Negative 4. Heel Pain: Negative 5. Cuboid: Negative 6. Navicular: Negative 7. Tibiotalar Joint: Negative 8. Subtalar Joint: Negative 9. Posterior Recess: Negative  Tendon Pain: 1. Achilles: Negative 2. Peroneals: Negative 3. Posterior Tibialis: Negative 4. Tibialis Anterior: Negative  Ligament Pain: 1. ATFL: + 2. CFL: Negative  3. PTFL: Negative 4. Deltoid Ligaments: Negative 5. Lis Franc Ligament: Negative  Stability:  1. Anterior Drawer: Negative 2. Posterior Drawer: Negative  Strength: 5/5 TA/GS/EHL  Pulses: 2+ DP/PT Pulses  Neuro: Intact motor and sensory  Additional Test: 1. Calcaneal Squeeze Test: Negative 2. Syndesmosis Squeeze Test: Negative [de-identified] : ACC: [id number] EXAM: XR FOOT COMP MIN 3 VIEWS RT ORDERED BY: HEATHER NIELSEN  ACC: [id number] EXAM: XR ANKLE COMP MIN 3 VIEWS RT ORDERED BY: HEATHER NIELSEN  PROCEDURE DATE: 08/29/2024    INTERPRETATION: Right ankle and right foot. Patient had local trauma.  Right ankle. 3 views.  Bony structures are unremarkable and similar to March 16, 2004.  Right foot. 3 views.  Bony structures unremarkable.  IMPRESSION: Negative studies.  --- End of Report ---      CHRISSY MCFADDEN MD; Attending Radiologist This document has been electronically signed. Aug 29 2024 4:21PM

## 2024-09-25 ENCOUNTER — NON-APPOINTMENT (OUTPATIENT)
Age: 49
End: 2024-09-25

## 2024-09-25 ENCOUNTER — APPOINTMENT (OUTPATIENT)
Dept: ORTHOPEDIC SURGERY | Facility: CLINIC | Age: 49
End: 2024-09-25
Payer: OTHER MISCELLANEOUS

## 2024-09-25 DIAGNOSIS — M25.571 PAIN IN RIGHT ANKLE AND JOINTS OF RIGHT FOOT: ICD-10-CM

## 2024-09-25 DIAGNOSIS — S99.911A UNSPECIFIED INJURY OF RIGHT ANKLE, INITIAL ENCOUNTER: ICD-10-CM

## 2024-09-25 DIAGNOSIS — S93.401A SPRAIN OF UNSPECIFIED LIGAMENT OF RIGHT ANKLE, INITIAL ENCOUNTER: ICD-10-CM

## 2024-09-25 PROCEDURE — 99213 OFFICE O/P EST LOW 20 MIN: CPT

## 2024-09-25 NOTE — ADDENDUM
[FreeTextEntry1] : I, Ron Ortega, acted solely as a scribe for Dr. Ron Jasmine on this date 09/25/2024  .   All medical record entries made by the Scribe were at my, Dr. Ron Jasmine, direction and personally dictated by me on 09/25/2024 . I have reviewed the chart and agree that the record accurately reflects my personal performance of the history, physical exam, assessment and plan. I have also personally directed, reviewed, and agreed with the chart.

## 2024-09-25 NOTE — HISTORY OF PRESENT ILLNESS
[FreeTextEntry1] :  09/25/2024: ELDER LEVY is a 49 year old female presenting to the office for a follow up evaluation of her right ankle. She reports that her right ankle pain has improved since her last visit.  The patient presents to the office in sneakers and ambulating with an ASO for assistance.  Patient is a 49-year-old, left hand dominant female who presents in office status post work related injury on 8/29/2024. The patient works for [employer].  The patient works as a .  During the injury the patient injured right ankle.  She states that she was stepping out of the building, and she rolled her right ankle, injuring the right ankle.   The patient is out of work.  The patient is driving and wearing an air cast.  She states that the pain is slowly improving since the injury.  The patient is not in PT.  The patient states that she has been doing home exercises on her own.  Pain scale: Right ankle: 4/10   Activities that make the pain better: Rest Ice Heat   Activities that make pain worse: ADL's Lifting heavy objects Chores Work Sleeping Stairs Seating to standing position Walking

## 2024-09-25 NOTE — PHYSICAL EXAM
[de-identified] : Right ankle Physical Examination:  General: Alert and oriented x3.  In no acute distress.  Pleasant in nature with a normal affect.  No apparent respiratory distress.  Erythema, Warmth, Rubor: Negative Swelling: + mild lateral ankle swelling.   ROM: 1. Dorsiflexion: 10 degrees 2. Plantarflexion: 40 degrees 3. Inversion: 30 degrees 4. Eversion: 20 degrees 5. Subtalar: 10 degrees  Tenderness to Palpation:  1. Lateral Malleolus: Negative 2. Medial Malleolus: Negative 3. Proximal Fibular Pain: Negative 4. Heel Pain: Negative 5. Cuboid: Negative 6. Navicular: Negative 7. Tibiotalar Joint: Negative 8. Subtalar Joint: Negative 9. Posterior Recess: Negative  Tendon Pain: 1. Achilles: Negative 2. Peroneals: Negative 3. Posterior Tibialis: Negative 4. Tibialis Anterior: Negative  Ligament Pain: 1. ATFL: + 2. CFL: Negative  3. PTFL: Negative 4. Deltoid Ligaments: Negative 5. Lis Franc Ligament: Negative  Stability:  1. Anterior Drawer: Negative 2. Posterior Drawer: Negative  Strength: 5/5 TA/GS/EHL  Pulses: 2+ DP/PT Pulses  Neuro: Intact motor and sensory  Additional Test: 1. Calcaneal Squeeze Test: Negative 2. Syndesmosis Squeeze Test: Negative [de-identified] : ACC: [id number] EXAM: XR FOOT COMP MIN 3 VIEWS RT ORDERED BY: HEATHER NIELSEN  ACC: [id number] EXAM: XR ANKLE COMP MIN 3 VIEWS RT ORDERED BY: HEATHER NIELSEN  PROCEDURE DATE: 08/29/2024    INTERPRETATION: Right ankle and right foot. Patient had local trauma.  Right ankle. 3 views.  Bony structures are unremarkable and similar to March 16, 2004.  Right foot. 3 views.  Bony structures unremarkable.  IMPRESSION: Negative studies.  --- End of Report ---      CHRISSY MCFADDEN MD; Attending Radiologist This document has been electronically signed. Aug 29 2024 4:21PM

## 2024-09-25 NOTE — DISCUSSION/SUMMARY
[de-identified] :  Today I had a lengthy discussion with the patient regarding their right ankle pain.I have addressed all the patient's concerns surrounding the pathology of their condition.  I recommend that the patient utilize ice, NSAIDS PRN, and heat. They can also elevate their RLE above the level of the heart.  I recommend the patient undergo a course of physical therapy for the right ankle 2-3 times a week for a total of 8-12 weeks. A prescription was given for the physical therapy today. I recommend that the patient utilize an ankle sleeve.  Patient can return to work with no restrictions.  The patient understood and verbally agreed to the treatment plan. All of their questions were answered and they were satisfied with the visit. The patient should call the office if they have any questions or experience worsening symptoms.  FU 0n 4-6 weeks if needed.

## 2024-10-01 ENCOUNTER — TRANSCRIPTION ENCOUNTER (OUTPATIENT)
Age: 49
End: 2024-10-01

## 2024-10-01 ENCOUNTER — APPOINTMENT (OUTPATIENT)
Dept: RHEUMATOLOGY | Facility: CLINIC | Age: 49
End: 2024-10-01
Payer: COMMERCIAL

## 2024-10-01 DIAGNOSIS — M35.00 SICCA SYNDROME, UNSPECIFIED: ICD-10-CM

## 2024-10-01 DIAGNOSIS — M25.50 PAIN IN UNSPECIFIED JOINT: ICD-10-CM

## 2024-10-01 DIAGNOSIS — Z79.899 OTHER LONG TERM (CURRENT) DRUG THERAPY: ICD-10-CM

## 2024-10-01 DIAGNOSIS — R76.0 RAISED ANTIBODY TITER: ICD-10-CM

## 2024-10-01 PROCEDURE — 99214 OFFICE O/P EST MOD 30 MIN: CPT

## 2024-10-01 PROCEDURE — G2211 COMPLEX E/M VISIT ADD ON: CPT

## 2024-10-01 NOTE — PHYSICAL EXAM
[General Appearance - Alert] : alert [General Appearance - In No Acute Distress] : in no acute distress [General Appearance - Well Nourished] : well nourished [General Appearance - Well Developed] : well developed [Sclera] : the sclera and conjunctiva were normal [Outer Ear] : the ears and nose were normal in appearance [Neck Appearance] : the appearance of the neck was normal [Auscultation Breath Sounds / Voice Sounds] : lungs were clear to auscultation bilaterally [Heart Rate And Rhythm] : heart rate was normal and rhythm regular [Heart Sounds] : normal S1 and S2 [Heart Sounds Gallop] : no gallops [Murmurs] : no murmurs [Heart Sounds Pericardial Friction Rub] : no pericardial rub [Bowel Sounds] : normal bowel sounds [Abdomen Tenderness] : non-tender [Abdomen Soft] : soft [No CVA Tenderness] : no ~M costovertebral angle tenderness [No Spinal Tenderness] : no spinal tenderness [] : no rash [No Focal Deficits] : no focal deficits [Oriented To Time, Place, And Person] : oriented to person, place, and time [Abnormal Walk] : normal gait [Nail Clubbing] : no clubbing  or cyanosis of the fingernails [Musculoskeletal - Swelling] : no joint swelling seen [Motor Tone] : muscle strength and tone were normal

## 2024-10-01 NOTE — HISTORY OF PRESENT ILLNESS
[Home] : at home, [unfilled] , at the time of the visit. [Medical Office: ([facility])___] : at the medical office located in  [Verbal consent obtained from patient] : the patient, [unfilled] [FreeTextEntry1] : Pt presenting today for a f.u visit for Sjogren's/ SLE overlap. Last seen 08/2024. LV re started HCQ, pilocarpine. Tolerating well. No side effects.  Labs from 07/2024 reviewed with pt in length.  Today reports feeling well overall.  Today presenting with mild SICCA symptoms, arthralgias, fatigue, morning stiffness 30 mins+. Noted improvement with medications thus far.  Denies fever, chills, CP, SOB, abd pain, new rashes, falls. Denies swelling to the joints.  She is following ophthalmology and dentist ROS otherwise unchanged from LV

## 2024-10-01 NOTE — ASSESSMENT
[FreeTextEntry1] : Sjogren's/ SLE overlap  + SICCA symptoms, multiple cavities, intermittent polyarthralgia's, myalgias, photosensitivity  Was doing well on HCQ, pilocarpine and then lost to follow up. Last seen 2021 Restarted medications 06/2023 and reports feeling much better and then lost to follow up again Restarted medications again 06/2024  - c/w HCQ 200mg BID, pilocarpine 5mg BID - artificial tears prn, ophthalmology f.u - biotin mouthwash/spray - encouraged hydration, lozenges, omega 3 FA supplementation  - avoidance of caffeine, sugars, candy, food that can exacerbate dry mouth - ophthalmology and dental f/u - encouraged compliance with medications and follow up visits  Discussed treatment plan with the patient. The patient was given the opportunity to ask questions and all questions were answered to their satisfaction.

## 2025-02-13 ENCOUNTER — EMERGENCY (EMERGENCY)
Facility: HOSPITAL | Age: 50
LOS: 1 days | Discharge: HOME | End: 2025-02-13
Attending: EMERGENCY MEDICINE
Payer: SELF-PAY

## 2025-02-13 VITALS
DIASTOLIC BLOOD PRESSURE: 83 MMHG | OXYGEN SATURATION: 98 % | HEART RATE: 82 BPM | WEIGHT: 177.91 LBS | RESPIRATION RATE: 16 BRPM | SYSTOLIC BLOOD PRESSURE: 131 MMHG | TEMPERATURE: 98 F

## 2025-02-13 PROCEDURE — 99284 EMERGENCY DEPT VISIT MOD MDM: CPT | Mod: 25

## 2025-02-13 PROCEDURE — 93010 ELECTROCARDIOGRAM REPORT: CPT

## 2025-02-13 PROCEDURE — 71110 X-RAY EXAM RIBS BIL 3 VIEWS: CPT | Mod: 26

## 2025-02-13 PROCEDURE — 73562 X-RAY EXAM OF KNEE 3: CPT

## 2025-02-13 PROCEDURE — 73590 X-RAY EXAM OF LOWER LEG: CPT | Mod: 26,RT

## 2025-02-13 PROCEDURE — 73562 X-RAY EXAM OF KNEE 3: CPT | Mod: 26,LT

## 2025-02-13 PROCEDURE — 71110 X-RAY EXAM RIBS BIL 3 VIEWS: CPT

## 2025-02-13 PROCEDURE — 99053 MED SERV 10PM-8AM 24 HR FAC: CPT

## 2025-02-13 PROCEDURE — 93005 ELECTROCARDIOGRAM TRACING: CPT

## 2025-02-13 PROCEDURE — 73590 X-RAY EXAM OF LOWER LEG: CPT

## 2025-02-13 PROCEDURE — 99284 EMERGENCY DEPT VISIT MOD MDM: CPT

## 2025-02-13 RX ORDER — METHOCARBAMOL 500 MG
1000 TABLET ORAL ONCE
Refills: 0 | Status: DISCONTINUED | OUTPATIENT
Start: 2025-02-13 | End: 2025-02-13

## 2025-02-13 RX ORDER — IBUPROFEN 600 MG/1
1 TABLET, FILM COATED ORAL
Qty: 28 | Refills: 0
Start: 2025-02-13 | End: 2025-02-19

## 2025-02-13 RX ORDER — METHOCARBAMOL 500 MG
1500 TABLET ORAL ONCE
Refills: 0 | Status: COMPLETED | OUTPATIENT
Start: 2025-02-13 | End: 2025-02-13

## 2025-02-13 RX ORDER — LIDOCAINE HYDROCHLORIDE 30 MG/G
1 CREAM TOPICAL ONCE
Refills: 0 | Status: COMPLETED | OUTPATIENT
Start: 2025-02-13 | End: 2025-02-13

## 2025-02-13 RX ORDER — IBUPROFEN 600 MG/1
200 TABLET, FILM COATED ORAL ONCE
Refills: 0 | Status: DISCONTINUED | OUTPATIENT
Start: 2025-02-13 | End: 2025-02-13

## 2025-02-13 RX ORDER — IBUPROFEN 600 MG/1
600 TABLET, FILM COATED ORAL ONCE
Refills: 0 | Status: COMPLETED | OUTPATIENT
Start: 2025-02-13 | End: 2025-02-13

## 2025-02-13 RX ORDER — LIDOCAINE HYDROCHLORIDE 30 MG/G
1 CREAM TOPICAL
Qty: 2 | Refills: 0
Start: 2025-02-13 | End: 2025-02-19

## 2025-02-13 RX ORDER — METHOCARBAMOL 500 MG
2 TABLET ORAL
Qty: 42 | Refills: 0
Start: 2025-02-13 | End: 2025-02-19

## 2025-02-13 RX ADMIN — Medication 1500 MILLIGRAM(S): at 08:48

## 2025-02-13 RX ADMIN — IBUPROFEN 600 MILLIGRAM(S): 600 TABLET, FILM COATED ORAL at 08:48

## 2025-02-13 RX ADMIN — LIDOCAINE HYDROCHLORIDE 1 PATCH: 30 CREAM TOPICAL at 08:49

## 2025-02-13 RX ADMIN — LIDOCAINE HYDROCHLORIDE 1 PATCH: 30 CREAM TOPICAL at 10:51

## 2025-02-13 NOTE — ED PROVIDER NOTE - PROGRESS NOTE DETAILS
patient states she feels better. patient was informed that pain may actually get worse in the next few days but that she should take pain meds as directed

## 2025-02-13 NOTE — ED PROVIDER NOTE - PHYSICAL EXAMINATION
Gen: well appearing, no acute distress  Head: normocephalic, atraumatic  EENT: EOMI, moist mucous membranes, no scleral icterus  Lung: no increased work of breathing, clear to auscultation bilaterally, no wheezing, rales, rhonchi, speaking in full sentences  CV: regular rate, regular rhythm, normal s1/s2, 2+ radial pulses bilaterally  Abd: soft, non-tender, non-distended,    MSK: +tenderness to palpation over lower aspect of right knee +faint ecchymosis over right knee, no visible deformities, full range of motion in all 4 extremities, +able to bear weight over both knees  Neuro: Awake, alert, no focal neurologic deficits  Skin: No obvious rash, no jaundice  Psych: normal affect, normal speech
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
## 2025-02-13 NOTE — ED PROVIDER NOTE - PATIENT PORTAL LINK FT
You can access the FollowMyHealth Patient Portal offered by [facility] by registering at the following website: [email or web address]. By joining [facility]’s FollowMyHealth portal, you will also be able to view your health information using other applications (apps) compatible with our system.
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
## 2025-02-13 NOTE — ED ADULT NURSE NOTE - NSFALLRISKASMT_ED_ALL_ED_DT
"Oncology Rooming Note    November 2, 2020 7:47 AM   Shena Hartmann is a 72 year old female who presents for:    Chief Complaint   Patient presents with     Lab Only     venipuncture, vitals checked     RECHECK     provider appt, med review, MM     Initial Vitals: /70   Pulse 77   Temp 97.6  F (36.4  C)   Resp 18   Wt 55.4 kg (122 lb 3.2 oz)   SpO2 97%   BMI 22.35 kg/m   Estimated body mass index is 22.35 kg/m  as calculated from the following:    Height as of 3/5/19: 1.575 m (5' 2\").    Weight as of this encounter: 55.4 kg (122 lb 3.2 oz). Body surface area is 1.56 meters squared.  Moderate Pain (4) Comment: Data Unavailable   No LMP recorded. Patient is postmenopausal.  Allergies reviewed: Yes  Medications reviewed: Yes    Medications: Medication refills not needed today.  Pharmacy name entered into [employer]:    [id number] - [place], [place] - [address] [facility] [place] [address]  WRITTEN PRESCRIPTION REQUESTED  [facility] - [place], [place] - [address]    Clinical concerns: Patient reports ongoing fatigue and malaise with generalized aches and pains, and \"osillating blood pressures\" at home.  She denies fevers or respiratory symptoms.   Analy was notified.      JANETH MINAYA RN              " 13-Feb-2025 08:37
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
## 2025-06-17 ENCOUNTER — APPOINTMENT (OUTPATIENT)
Dept: RHEUMATOLOGY | Facility: CLINIC | Age: 50
End: 2025-06-17
Payer: COMMERCIAL

## 2025-06-17 ENCOUNTER — NON-APPOINTMENT (OUTPATIENT)
Age: 50
End: 2025-06-17

## 2025-06-17 VITALS
DIASTOLIC BLOOD PRESSURE: 80 MMHG | TEMPERATURE: 97.5 F | SYSTOLIC BLOOD PRESSURE: 118 MMHG | WEIGHT: 170 LBS | RESPIRATION RATE: 17 BRPM | HEART RATE: 66 BPM | HEIGHT: 64 IN | OXYGEN SATURATION: 97 % | BODY MASS INDEX: 29.02 KG/M2

## 2025-06-17 PROCEDURE — 99215 OFFICE O/P EST HI 40 MIN: CPT

## 2025-06-17 PROCEDURE — G2211 COMPLEX E/M VISIT ADD ON: CPT | Mod: NC

## 2025-06-18 LAB
ALBUMIN SERPL ELPH-MCNC: 4.7 G/DL
ALP BLD-CCNC: 78 U/L
ALT SERPL-CCNC: 19 U/L
ANA SCREEN BY IMMUNOASSAY: POSITIVE
ANION GAP SERPL CALC-SCNC: 13 MMOL/L
APPEARANCE: CLEAR
AST SERPL-CCNC: 35 U/L
BASOPHILS # BLD AUTO: 0.05 K/UL
BASOPHILS NFR BLD AUTO: 0.9 %
BILIRUB SERPL-MCNC: 0.7 MG/DL
BILIRUBIN URINE: NEGATIVE
BLOOD URINE: NEGATIVE
BUN SERPL-MCNC: 21 MG/DL
C3 SERPL-MCNC: 120 MG/DL
C4 SERPL-MCNC: 15 MG/DL
CALCIUM SERPL-MCNC: 9.5 MG/DL
CCP AB SER IA-ACNC: <8 U/ML
CCP AB SER QL: NEGATIVE
CENTROMERE B AB SER-ACNC: <0.2 AL
CHLORIDE SERPL-SCNC: 103 MMOL/L
CHROMATIN AB SERPL-ACNC: <0.2 AL
CK SERPL-CCNC: 1206 U/L
CO2 SERPL-SCNC: 23 MMOL/L
COLOR: YELLOW
CREAT SERPL-MCNC: 1.18 MG/DL
CREAT SPEC-SCNC: 166 MG/DL
CREAT/PROT UR: 0.1 RATIO
CRP SERPL-MCNC: 4 MG/L
DSDNA AB SER-ACNC: 1 IU/ML
EGFRCR SERPLBLD CKD-EPI 2021: 57 ML/MIN/1.73M2
ENA JO1 AB SER-ACNC: <0.2 AL
ENA RNP AB SER-ACNC: 0.5 AL
ENA SCL70 AB SER-ACNC: <0.2 AL
ENA SM AB SER-ACNC: <0.2 AL
ENA SS-A AB SER-ACNC: >8 AL
ENA SS-B AB SER-ACNC: >8 AL
EOSINOPHIL # BLD AUTO: 0.15 K/UL
EOSINOPHIL NFR BLD AUTO: 2.8 %
GLUCOSE QUALITATIVE U: NEGATIVE MG/DL
GLUCOSE SERPL-MCNC: 93 MG/DL
HCT VFR BLD CALC: 37.4 %
HGB BLD-MCNC: 12.8 G/DL
IMM GRANULOCYTES NFR BLD AUTO: 0.7 %
KETONES URINE: NEGATIVE MG/DL
LEUKOCYTE ESTERASE URINE: NEGATIVE
LYMPHOCYTES # BLD AUTO: 1.5 K/UL
LYMPHOCYTES NFR BLD AUTO: 27.7 %
MAN DIFF?: NORMAL
MCHC RBC-ENTMCNC: 31.6 PG
MCHC RBC-ENTMCNC: 34.2 G/DL
MCV RBC AUTO: 92.3 FL
MONOCYTES # BLD AUTO: 0.41 K/UL
MONOCYTES NFR BLD AUTO: 7.6 %
NEUTROPHILS # BLD AUTO: 3.26 K/UL
NEUTROPHILS NFR BLD AUTO: 60.3 %
NITRITE URINE: NEGATIVE
PH URINE: 6
PLATELET # BLD AUTO: 304 K/UL
POTASSIUM SERPL-SCNC: 4 MMOL/L
PROT SERPL-MCNC: 7.6 G/DL
PROT UR-MCNC: 19 MG/DL
PROTEIN URINE: NORMAL MG/DL
RBC # BLD: 4.05 M/UL
RBC # FLD: 11.9 %
RHEUMATOID FACT SERPL-ACNC: 41 IU/ML
RIBOSOMAL P AB SER-ACNC: <0.2 AL
SODIUM SERPL-SCNC: 140 MMOL/L
SPECIFIC GRAVITY URINE: 1.02
TSH SERPL-ACNC: 26.3 UIU/ML
UROBILINOGEN URINE: 0.2 MG/DL
WBC # FLD AUTO: 5.41 K/UL

## 2025-09-15 ENCOUNTER — APPOINTMENT (OUTPATIENT)
Dept: RHEUMATOLOGY | Facility: CLINIC | Age: 50
End: 2025-09-15